# Patient Record
Sex: FEMALE | Race: WHITE | HISPANIC OR LATINO | Employment: UNEMPLOYED | URBAN - METROPOLITAN AREA
[De-identification: names, ages, dates, MRNs, and addresses within clinical notes are randomized per-mention and may not be internally consistent; named-entity substitution may affect disease eponyms.]

---

## 2022-11-07 ENCOUNTER — OFFICE VISIT (OUTPATIENT)
Dept: FAMILY MEDICINE CLINIC | Facility: CLINIC | Age: 57
End: 2022-11-07

## 2022-11-07 VITALS
SYSTOLIC BLOOD PRESSURE: 116 MMHG | BODY MASS INDEX: 31.83 KG/M2 | HEART RATE: 80 BPM | TEMPERATURE: 98.7 F | HEIGHT: 62 IN | WEIGHT: 173 LBS | DIASTOLIC BLOOD PRESSURE: 70 MMHG | OXYGEN SATURATION: 98 % | RESPIRATION RATE: 16 BRPM

## 2022-11-07 DIAGNOSIS — Z76.89 ENCOUNTER TO ESTABLISH CARE: Primary | ICD-10-CM

## 2022-11-07 DIAGNOSIS — E03.9 HYPOTHYROIDISM, UNSPECIFIED TYPE: ICD-10-CM

## 2022-11-07 DIAGNOSIS — Z11.59 NEED FOR HEPATITIS C SCREENING TEST: ICD-10-CM

## 2022-11-07 DIAGNOSIS — R53.83 OTHER FATIGUE: ICD-10-CM

## 2022-11-07 DIAGNOSIS — E78.5 HYPERLIPIDEMIA, UNSPECIFIED HYPERLIPIDEMIA TYPE: ICD-10-CM

## 2022-11-07 DIAGNOSIS — I10 PRIMARY HYPERTENSION: ICD-10-CM

## 2022-11-07 DIAGNOSIS — I35.8 AORTIC SYSTOLIC MURMUR ON EXAMINATION: ICD-10-CM

## 2022-11-07 DIAGNOSIS — Z11.4 SCREENING FOR HIV (HUMAN IMMUNODEFICIENCY VIRUS): ICD-10-CM

## 2022-11-07 DIAGNOSIS — J45.909 UNCOMPLICATED ASTHMA, UNSPECIFIED ASTHMA SEVERITY, UNSPECIFIED WHETHER PERSISTENT: ICD-10-CM

## 2022-11-07 RX ORDER — ATORVASTATIN CALCIUM 20 MG/1
20 TABLET, FILM COATED ORAL DAILY
Qty: 30 TABLET | Refills: 3 | Status: SHIPPED | OUTPATIENT
Start: 2022-11-07

## 2022-11-07 RX ORDER — ENALAPRIL MALEATE 10 MG/1
10 TABLET ORAL DAILY
Qty: 30 TABLET | Refills: 3 | Status: SHIPPED | OUTPATIENT
Start: 2022-11-07

## 2022-11-07 RX ORDER — LEVOTHYROXINE SODIUM 0.1 MG/1
100 TABLET ORAL DAILY
Qty: 30 TABLET | Refills: 3 | Status: SHIPPED | OUTPATIENT
Start: 2022-11-07

## 2022-11-07 RX ORDER — AMLODIPINE BESYLATE 10 MG/1
10 TABLET ORAL DAILY
Qty: 30 TABLET | Refills: 3 | Status: SHIPPED | OUTPATIENT
Start: 2022-11-07

## 2022-11-07 RX ORDER — ALBUTEROL SULFATE 90 UG/1
2 AEROSOL, METERED RESPIRATORY (INHALATION) EVERY 6 HOURS PRN
Qty: 18 G | Refills: 5 | Status: SHIPPED | OUTPATIENT
Start: 2022-11-07

## 2022-11-07 RX ORDER — FLUTICASONE PROPIONATE 50 MCG
1 SPRAY, SUSPENSION (ML) NASAL DAILY
Qty: 16 G | Refills: 3 | Status: SHIPPED | OUTPATIENT
Start: 2022-11-07

## 2022-11-07 NOTE — PROGRESS NOTES
Name: Darshan Raines      : 1965      MRN: [id number]  Encounter Provider: Jessica Reyes MD  Encounter Date: 2022   Encounter department: [address] [place] [address]     1  Encounter to establish care  Assessment & Plan:  Patient is transplant from [place]   - Advised to sign medical release form for transfer of records      2  Hypothyroidism, unspecified type  Assessment & Plan:  Managed on Synthroid 100 mcg  Currently reports excessive fatigue  No TSH on file  Orders:  -     TSH, 3rd generation with Free T4 reflex; Future  -     levothyroxine (Synthroid) 100 mcg tablet; Take 1 tablet (100 mcg total) by mouth daily    3  Uncomplicated asthma, unspecified asthma severity, unspecified whether persistent  Assessment & Plan:  Symptoms well controlled at this time  Managed on flonase as needed  Patient reports previously having an inhaler which she has not for quite some time  No PFTs on file  Patient is never smoker  Orders:  -     Complete PFT with post bronchodilator; Future  -     fluticasone (FLONASE) 50 mcg/act nasal spray; 1 spray into each nostril daily  -     albuterol (Ventolin HFA) 90 mcg/act inhaler; Inhale 2 puffs every 6 (six) hours as needed for wheezing    4  Primary hypertension  Assessment & Plan:  Well controlled on home meds: Amlodipine 10 mg QD and Enalapril 10 mg QD  BP today 116/70  Will continue home meds for now - will consider dropping agent if BP remains this well controlled    Orders:  -     amLODIPine (NORVASC) 10 mg tablet; Take 1 tablet (10 mg total) by mouth daily  -     enalapril (VASOTEC) 10 mg tablet; Take 1 tablet (10 mg total) by mouth daily    5  Hyperlipidemia, unspecified hyperlipidemia type  Assessment & Plan:  Managed on Lipitor 20 mg QD  No lipid panel on file  Orders:  -     Lipid Panel with Direct LDL reflex; Future  -     atorvastatin (LIPITOR) 20 mg tablet;  Take 1 tablet (20 mg total) by mouth daily    6  Need for hepatitis C screening test  -     Hepatitis C Antibody ([facility]); Future    7  Screening for HIV (human immunodeficiency virus)  -     HIV 1/2 Antigen/Antibody (4th Generation) w Reflex [facility]; Future    8  Aortic systolic murmur on examination  Assessment & Plan:  No history of heart disease or MI  Likely 2/2 age related calcifications    Orders:  -     Echo complete w/ contrast if indicated; Future; Expected date: 11/07/2022    9  Other fatigue  Assessment & Plan:  History of hypothyroidism   Denies history of anemia  Will check labs as per orders below    Orders:  -     CBC and differential; Future  -     Comprehensive metabolic panel; Future  -     Vitamin D 25 hydroxy; Future         Subjective     Patient presents today to establish care  Moved to [place] 2 months prior from the [place]  Previously lived in the  and emigrated to [address] [place] [address] in 2019  PMH: asthma, htn, hyperlipidemia, hypothyroidism, history of uterine cancer  Medications: Atorvastatin 20 mg QD, Synthroid 100 mcg, Amlodipine 10 mg, Enalipril 10 mg, Flonase 50 mcg  Allergies: Reports emotional stress can exacerbate asthma  Surg Hx: 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,  Hysterectomy, appendectomy, surgical intervention for sinusitis   Social Hx: Denies tobacco, alcohol, or illicit drug use  Fam Hx:     Current Concerns: None       Review of Systems   Constitutional: Negative for chills and fever  HENT: Negative for congestion, ear pain, rhinorrhea and sinus pain  Eyes: Negative for visual disturbance  Respiratory: Negative for chest tightness, shortness of breath and wheezing  Cardiovascular: Negative for chest pain and palpitations  Gastrointestinal: Negative for abdominal pain, constipation, diarrhea and vomiting  Endocrine: Negative for polyuria  Genitourinary: Negative for dysuria  Musculoskeletal: Negative for arthralgias and myalgias     Neurological: Negative for dizziness, syncope and light-headedness  Psychiatric/Behavioral: Negative for hallucinations, self-injury and suicidal ideas  Past Medical History:   Diagnosis Date   • Hypertension      Past Surgical History:   Procedure Laterality Date   • APPENDECTOMY     • HYSTERECTOMY     • NOSE SURGERY     • THYROID SURGERY       No family history on file  Social History     Socioeconomic History   • Marital status:      Spouse name: None   • Number of children: None   • Years of education: None   • Highest education level: None   Occupational History   • None   Tobacco Use   • Smoking status: Never Smoker   • Smokeless tobacco: Never Used   Substance and Sexual Activity   • Alcohol use: Not Currently   • Drug use: Never   • Sexual activity: None   Other Topics Concern   • None   Social History Narrative   • None     Social Determinants of Health     Financial Resource Strain: Low Risk    • Difficulty of Paying Living Expenses: Not hard at all   Food Insecurity: No Food Insecurity   • Worried About Running Out of Food in the Last Year: Never true   • Ran Out of Food in the Last Year: Never true   Transportation Needs: No Transportation Needs   • Lack of Transportation (Medical): No   • Lack of Transportation (Non-Medical): No   Physical Activity: Not on file   Stress: Not on file   Social Connections: Not on file   Intimate Partner Violence: Not on file   Housing Stability: Not on file     No current outpatient medications on file prior to visit  No Known Allergies    There is no immunization history on file for this patient  Objective     /70 (BP Location: Right arm, Patient Position: Sitting, Cuff Size: Large)   Pulse 80   Temp 98 7 °F (37 1 °C) (Temporal)   Resp 16   Ht 5' 2" (1 575 m)   Wt 78 5 kg (173 lb)   SpO2 98%   Breastfeeding No   BMI 31 64 kg/m²     Physical Exam  Constitutional:       Appearance: Normal appearance  HENT:      Head: Normocephalic and atraumatic        Nose: Nose normal    Eyes: Conjunctiva/sclera: Conjunctivae normal    Cardiovascular:      Rate and Rhythm: Normal rate and regular rhythm  Heart sounds: Murmur (grade I/II midsystolic click at aortic region) heard  Pulmonary:      Effort: Pulmonary effort is normal       Breath sounds: Normal breath sounds  Musculoskeletal:         General: Normal range of motion  Cervical back: Normal range of motion  Skin:     General: Skin is warm and dry  Neurological:      Mental Status: She is alert and oriented to person, place, and time     Psychiatric:         Behavior: Behavior normal        Kolby Toney MD

## 2022-11-07 NOTE — PATIENT INSTRUCTIONS
Plan de alimentación con "enfoque dietético para detener la hipertensión” (DASH, por deepti siglas en inglés)   LO QUE NECESITA SABER:   El plan de alimentación DASH está diseñado para ayudar a prevenir o disminuir la hipertensión  También puede ayudar a bajar el colesterol rocio (colesterol LDL) y disminuir keyes riesgo de enfermedad cardíaca  El plan es bajo en sodio, azúcar, grasas dañinas, y grasas en keyes totalidad  Es alto en potasio, calcio, magnesio y [place]  Estos nutrientes se agregan al consumir más frutas, vegetales y granos enteros  Con el plan de alimentación DASH, usted necesita consumir un número específico de porciones de cada jose roberto de alimentos  [place] le ayudará a consumir las cantidades suficientes de ciertos nutrientes y limitar otros  La cantidad de porciones que usted debe comer depende de la cantidad de calorías que usted necesita  Keyes dietista puede ayudarlo a crear planes de comidas con la cantidad [place] de porciones para cada jose roberto de alimentos  INSTRUCCIONES SOBRE EL JOI HOSPITALARIA:   Lo que necesita saber acerca del sodio: Keyes dietista le indicará la cantidad de sodio que usted debe consumir a diario  La gente que tiene la presión arterial joi debe consumir de 1,500 a 2,300 mg de sodio al día dar bonifacio  Kavon cucharadita (cdta) de sal tiene 2,300 mg de sodio  [place] puede parecer dar kavon meta difícil, sanjay pequeños cambios en los alimentos que usted consume pueden hacer kavon gran diferencia  Keyes médico o dietista puede ayudarlo a crear un plan alimenticio que cumpla keyes límite de sodio  Suzy las etiquetas de los alimentos  Las etiquetas pueden ayudarle a escoger alimentos bajos en sodio  La cantidad de sodio está incluida en miligramos (mg)  La columna del porcentaje de valor diario indica la cantidad de necesidades diarias satisfechas con 1 porción del alimento para cada nutriente en la lista  Escoja alimentos que tengan menos de 5% del porcentaje diario de [place]   Estos alimentos se consideran bajos en sodio  Los alimentos que tienen 20% o más del porcentaje diario de sodio se consideran alimentos altos en sodio  Evite alimentos que tengan más de 300 mg de sodio por porción  Escoja alimentos Sabine Sprinkles diga que son bajos en sodio, con sodio reducido, o sin sal agregada  Limite la sal agregada  No sale la comida en la navas si se añade sal al cocinar  Use hierbas y condimentos, dar cebollas, ajo y especias sin sal para agregar sabor  Use jugo de lima, oneil o vinagre para agregar un sabor ácido  Use chiles picantes o kavon cantidad pequeña de salsa picante para agregar un sabor picante  Limite los alimentos con alto contenido de sal agregada, dar los siguientes:    Condimentos hechos con sal, dar sal de ajo, sal de apio, sal de cebolla, sal condimentada, suavizantes para sofy, y glutamato de monosodio (MSG, por deepti siglas en inglés)    Sopa Miso y mezclas para sopa enlatadas o secas    Salsa de soya regular, salsa de [address], [address] [facility], salsa para [facility], [address] [facility], y la mayoría de las vinagres con sabor    Alimentos para merendar, dar erlinda tostadas, palomitas de [place], pretzels, piel de cerdo, galletas de soda [facility], y nueces [employer], dar cenas, entradas, vegetales en salsa, y sofy [employer] sustitutos para la sal  Pregúntele a keyes médico si es posible usar sustitutos de la sal  Algunos sustitutos de la sal vienen con ingredientes que pueden ser dañinos si usted tiene ciertos padecimientos médicos  Escoja los alimentos cuidadosamente cuando sale a comer a restaurantes: las comidas de los restaurantes, sobre todo restaurantes de comida rápida, cortez siempre son altas en sodio  Algunos restaurantes ofrecen información nutricional que indica la cantidad de sodio en deepti alimentos   Pida que preparen deepti comidas con menos sal o sin sal     Lo que necesita saber acerca de las grasas: Las grasas insaturadas y los ácidos grasos omega-3 son ejemplos de grasas saludables  Las grasas no saludables incluyen las grasas saturadas y las grasas trans  Incluya grasas saludables, dar las siguientes:     Aceites de cocina, dar el de soja, canola, wheat o girasol    Pescados grasos, dar el salmón, el atún, la caballa o las klarissa    Aceite de linaza o linaza molida    ½ taza de frijoles cocidos, dar frijoles negros, frijoles rojos o frijoles pintos    1½ onzas de joann secos bajos en sodio, dar almendras o nueces    [place] de maní baja en azúcar y sodio    [place], dar las de chía o girasol       Limite o no consuma grasas poco saludables, dar los siguientes:     Alimentos que contienen grasa de origen animal, dar las sofy grasas, la [place], la [place] y la crema    Agia [place]kla, margarina en shila, aceite de box y aceite de hamlet     Aderezo de ensalada completo o cremoso    Sopa cremosa    Galletas, erlinda fritas y productos de panadería elaborados con margarina o manteca    Alimentos fritos con grasas poco saludables    Salsa de carne y salsas, dar la [place] o la de queso    Lo que necesita saber acerca de los carbohidratos: Todos los carbohidratos se descomponen en azúcar  Los carbohidratos complejos contienen más fibra que los simples  [place] significa que los carbohidratos complejos entran en el torrente sanguíneo más lentamente y causan menos picos de azúcar en la ludmila  Intenta incluir más carbohidratos complejos y menos carbohidratos simples    Incluya carbohidratos complejos, dar los siguientes:     1 tajada de pan integral    1 onza de cereal seco que no contenga azúcar añadida    ½ taza de kimberli cocida    2 onzas de pasta integral cocida    ½ taza de arroz integral cocido    Limite o no consuma carbohidratos simples, dar los siguientes:     Productos horneados, dar rosquillas, pasteles y galletas    Mezclas para pan de maíz y galletas    Arroz pimentel y mezclas de pasta, dar los [place] con queso de caja    Cereales instantáneos y fríos que contienen azúcar    Alois Cashing y helado que contienen azúcar    Condimentos, dar el ketchup    Bebidas con alto contenido en azúcar, dar refrescos, limonadas y jugos de frutas    Lo que usted necesita saber sobre las verduras y las frutas: Las verduras y las frutas pueden ser frescas, congeladas o enlatadas  Si es posible, trate de elegir opciones enlatadas bajas en sodio  Incluya kavon variedad de verduras y frutas, dar las siguientes:     1 [place], [place] o melocotón medianos (aproximadamente ½ taza picada)    ½ banana pequeña    ½ taza de bayas, dar arándanos, fresas o moras    1 taza de verduras de hoja jacinto crudas, dar [place], espinacas, col rizada o berza    ½ taza de verduras congeladas o enlatadas (sin sal agregada), dar judías verdes    ½ taza de fruta fresca, congelada o enlatada (enlatada en sirope liviano o jugo de fruta)    ½ taza de jugo de verduras o frutas    Limite o no consuma verduras y frutas elaboradas de las siguientes maneras:     [place] congelada, dar las cerezas, con azúcar añadida    Frutas en crema o salsa de [place]    Las verduras enlatadas son altas en sodio  Sauerkraut, vegetales en escabeche, y otros alimentos preparados con vinagre    Vegetales fritos o vegetales en mantequilla o salsas altas en grasas    Lo que necesita saber acerca de los alimentos con proteína:   Incluya alimentos proteicos magros o bajos en grasa, dar los siguientes:     Deloris samantha (charis, [place]) sin piel    Pescado (sobre todo pescado con grasa, dar salmón, atún fresco o caballa)    [place] de res o de cerdo magra (leni, carne molida extra [place])    Claras de [place] y sustitutos del huevo    1 taza de leche descremada o leche 1%    1½ onzas de queso descremado o bajo en grasas    6 onzas de yogurt descremado o bajo en grasas    Limite o no consuma alimentos con alto contenido de proteínas, [facility] siguientes     [address] o [place], dar carne preparada con maíz, tocineta, jamón, perros calientes, y salchichas    Frijoles enlatados y deloris enlatadas o en pasta, dar deloris en conserva, klarissa, anchoas y [address] de [address] para emparedado, dar [place], [place], [place], y carne en rebanada    Deloris altas en grasas (biste estilo [place]-bone, carne molida para hamburguesas, costillas)    Huevos enteros y yemas de [place]    Leche [place], [place] al 2% y crema    Queso normal y queso fundido    Otras pautas que debe seguir:  Mantenga un peso saludable  Keyes riesgo de enfermedad cardíaca es aún más alto si usted tiene sobrepeso  Keyes médico podría sugerirle que adelgace si tiene sobrepeso  Usted puede perder peso si se propone consumir menos calorías y alimentos que tengan azúcar y grasas agregadas  El plan de alimentación DASH puede ayudarle a lograrlo  Ijeoma buena forma de disminuir el consumo de calorías es consumiendo porciones más pequeñas en cada comida y menos meriendas entre comidas  Consulte a keyes médico para obtener más información sobre cómo adelgazar  Realice actividad física con regularidad  El ejercicio regular puede ayudarle a alcanzar o mantener un peso saludable  El ejercicio regular también puede ayudarle a disminuir keyes presión arterial y mejorar deepti niveles de colesterol  Josseline ejercicios moderados por 30 minutos o más todos los días de la [place]  Para bajar peso, asegúrese de ejercitarse por lo menos 60 minutos  Consulte con keyes médico sobre un programa de ejercicio adecuado para usted  Limite el consumo de alcohol  Las mujeres deberían limitar el consumo de alcohol a 1 bebida por día  Los hombres deberían limitar el consumo de alcohol a 2 tragos al día  Un trago equivale a 12 onzas de cerveza, 5 onzas de vino o 1 onza y ½ de licor  Para más información:  [facility] [id number]  [place] [address]  Phone: 0- [phone]  Web Address: ItCheaper no    © Copyright [employer] 2022 Information is for End User's use only and may not be sold, redistributed or otherwise used for commercial purposes  All illustrations and images included in CareNotes® are the copyrighted property of [employer]  or [address] [place] [address] es sólo para uso en educación  Keyes intención no es darle un consejo médico sobre enfermedades o tratamientos  Colsulte con keyes Kermitt Console farmacéutico antes de seguir cualquier régimen médico para saber si es seguro y efectivo para usted

## 2022-11-08 NOTE — ASSESSMENT & PLAN NOTE
Patient is transplant from [place]   - Advised to sign medical release form for transfer of records

## 2022-11-08 NOTE — ASSESSMENT & PLAN NOTE
Symptoms well controlled at this time  Managed on flonase as needed  Patient reports previously having an inhaler which she has not for quite some time  No PFTs on file  Patient is never smoker

## 2022-11-08 NOTE — ASSESSMENT & PLAN NOTE
Well controlled on home meds: Amlodipine 10 mg QD and Enalapril 10 mg QD  BP today 116/70    Will continue home meds for now - will consider dropping agent if BP remains this well controlled

## 2022-11-09 ENCOUNTER — APPOINTMENT (OUTPATIENT)
Dept: LAB | Facility: CLINIC | Age: 57
End: 2022-11-09

## 2022-11-09 DIAGNOSIS — Z11.4 SCREENING FOR HIV (HUMAN IMMUNODEFICIENCY VIRUS): ICD-10-CM

## 2022-11-09 DIAGNOSIS — E03.9 HYPOTHYROIDISM, UNSPECIFIED TYPE: ICD-10-CM

## 2022-11-09 DIAGNOSIS — E78.5 HYPERLIPIDEMIA, UNSPECIFIED HYPERLIPIDEMIA TYPE: ICD-10-CM

## 2022-11-09 DIAGNOSIS — Z11.59 NEED FOR HEPATITIS C SCREENING TEST: ICD-10-CM

## 2022-11-09 DIAGNOSIS — R53.83 OTHER FATIGUE: ICD-10-CM

## 2022-11-09 LAB
25(OH)D3 SERPL-MCNC: 28.6 NG/ML (ref 30–100)
ALBUMIN SERPL BCP-MCNC: 4.2 G/DL (ref 3.5–5)
ALP SERPL-CCNC: 68 U/L (ref 46–116)
ALT SERPL W P-5'-P-CCNC: 23 U/L (ref 12–78)
ANION GAP SERPL CALCULATED.3IONS-SCNC: 1 MMOL/L (ref 4–13)
AST SERPL W P-5'-P-CCNC: 26 U/L (ref 5–45)
BASOPHILS # BLD AUTO: 0.02 THOUSANDS/ÂΜL (ref 0–0.1)
BASOPHILS NFR BLD AUTO: 0 % (ref 0–1)
BILIRUB SERPL-MCNC: 0.63 MG/DL (ref 0.2–1)
BUN SERPL-MCNC: 13 MG/DL (ref 5–25)
CALCIUM SERPL-MCNC: 9.4 MG/DL (ref 8.3–10.1)
CHLORIDE SERPL-SCNC: 108 MMOL/L (ref 96–108)
CHOLEST SERPL-MCNC: 129 MG/DL
CO2 SERPL-SCNC: 29 MMOL/L (ref 21–32)
CREAT SERPL-MCNC: 0.84 MG/DL (ref 0.6–1.3)
EOSINOPHIL # BLD AUTO: 0.09 THOUSAND/ÂΜL (ref 0–0.61)
EOSINOPHIL NFR BLD AUTO: 2 % (ref 0–6)
ERYTHROCYTE [DISTWIDTH] IN BLOOD BY AUTOMATED COUNT: 12.7 % (ref 11.6–15.1)
GFR SERPL CREATININE-BSD FRML MDRD: 77 ML/MIN/1.73SQ M
GLUCOSE P FAST SERPL-MCNC: 94 MG/DL (ref 65–99)
HCT VFR BLD AUTO: 45.7 % (ref 34.8–46.1)
HCV AB SER QL: NORMAL
HDLC SERPL-MCNC: 41 MG/DL
HGB BLD-MCNC: 14.2 G/DL (ref 11.5–15.4)
IMM GRANULOCYTES # BLD AUTO: 0.01 THOUSAND/UL (ref 0–0.2)
IMM GRANULOCYTES NFR BLD AUTO: 0 % (ref 0–2)
LDLC SERPL CALC-MCNC: 61 MG/DL (ref 0–100)
LYMPHOCYTES # BLD AUTO: 2.41 THOUSANDS/ÂΜL (ref 0.6–4.47)
LYMPHOCYTES NFR BLD AUTO: 43 % (ref 14–44)
MCH RBC QN AUTO: 30.1 PG (ref 26.8–34.3)
MCHC RBC AUTO-ENTMCNC: 31.1 G/DL (ref 31.4–37.4)
MCV RBC AUTO: 97 FL (ref 82–98)
MONOCYTES # BLD AUTO: 0.35 THOUSAND/ÂΜL (ref 0.17–1.22)
MONOCYTES NFR BLD AUTO: 6 % (ref 4–12)
NEUTROPHILS # BLD AUTO: 2.74 THOUSANDS/ÂΜL (ref 1.85–7.62)
NEUTS SEG NFR BLD AUTO: 49 % (ref 43–75)
NRBC BLD AUTO-RTO: 0 /100 WBCS
PLATELET # BLD AUTO: 293 THOUSANDS/UL (ref 149–390)
PMV BLD AUTO: 10.3 FL (ref 8.9–12.7)
POTASSIUM SERPL-SCNC: 4.4 MMOL/L (ref 3.5–5.3)
PROT SERPL-MCNC: 8.3 G/DL (ref 6.4–8.4)
RBC # BLD AUTO: 4.72 MILLION/UL (ref 3.81–5.12)
SODIUM SERPL-SCNC: 138 MMOL/L (ref 135–147)
TRIGL SERPL-MCNC: 136 MG/DL
TSH SERPL DL<=0.05 MIU/L-ACNC: 1.04 UIU/ML (ref 0.45–4.5)
WBC # BLD AUTO: 5.62 THOUSAND/UL (ref 4.31–10.16)

## 2022-11-10 LAB — HIV 1+2 AB+HIV1 P24 AG SERPL QL IA: NORMAL

## 2022-11-11 ENCOUNTER — OFFICE VISIT (OUTPATIENT)
Dept: FAMILY MEDICINE CLINIC | Facility: CLINIC | Age: 57
End: 2022-11-11

## 2022-11-11 VITALS
OXYGEN SATURATION: 98 % | TEMPERATURE: 97.9 F | WEIGHT: 175 LBS | SYSTOLIC BLOOD PRESSURE: 122 MMHG | RESPIRATION RATE: 18 BRPM | HEART RATE: 80 BPM | BODY MASS INDEX: 32.2 KG/M2 | HEIGHT: 62 IN | DIASTOLIC BLOOD PRESSURE: 74 MMHG

## 2022-11-11 DIAGNOSIS — Z02.4 DRIVER'S PERMIT PE (PHYSICAL EXAMINATION): Primary | ICD-10-CM

## 2022-11-11 NOTE — PROGRESS NOTES
Name: Lamberto Bolivar      : 1965      MRN: [id number]  Encounter Provider: Viridiana Alexandra MD  Encounter Date: 2022   Encounter department: [address] [place] [address]  's permit PE (physical examination)  Assessment & Plan:  Patient's neurological history was assessed and reviewed  Patient has no neuro-epileptic, neurocognitive or neurodegenerative diseases that would prevent them from operating a motor vehicle  Patient is not currently on any medication that would cause neurologic side effects  Patient has no significant past medical history nor any significant family history that would interfere with their ability to drive a vehicle  - Physical exam was unremarkable for neurologic deficits  - Patient is medically cleared to obtain his 's Licence upon visual eye exam clearance  No further workup is required  - Discussed importance of seat belt safety for  and all passengers in the car  Discussed importance of patient’s knowledge of car seat and booster seat use when applicable  - Advised not to use alcohol, drugs, or substances which inhibit ability to operate a vehicle  - Discussed refraining from use of cell phone or other devices which can distract while operating a vehicle  Reviewed importance of familiarizing one's self with the rules and regulations outlined in drivers manual   - Advised patient to update our office immediately if there are any changes to health/medical records           Subjective     This is a pleasant 64 yo patient with no significant PMH who presents to the [facility]  Patient states that she is in good standing health and is currently not on any medications or substances that impair cognition  Patient denies having debilitating disease, neurologic deficits, history of seizure disorder, or psychiatric conditions        Review of Systems Constitutional: Negative for chills and fever  HENT: Negative for congestion, ear pain, rhinorrhea and sinus pain  Eyes: Negative for visual disturbance  Respiratory: Negative for chest tightness, shortness of breath and wheezing  Cardiovascular: Negative for chest pain and palpitations  Gastrointestinal: Negative for abdominal pain, constipation, diarrhea and vomiting  Endocrine: Negative for polyuria  Genitourinary: Negative for dysuria  Musculoskeletal: Negative for arthralgias and myalgias  Neurological: Negative for dizziness, syncope and light-headedness  Psychiatric/Behavioral: Negative for hallucinations, self-injury and suicidal ideas  Past Medical History:   Diagnosis Date   • Hypertension      Past Surgical History:   Procedure Laterality Date   • APPENDECTOMY     • HYSTERECTOMY     • NOSE SURGERY     • THYROID SURGERY       No family history on file  Social History     Socioeconomic History   • Marital status:      Spouse name: None   • Number of children: None   • Years of education: None   • Highest education level: None   Occupational History   • None   Tobacco Use   • Smoking status: Never Smoker   • Smokeless tobacco: Never Used   Substance and Sexual Activity   • Alcohol use: Not Currently   • Drug use: Never   • Sexual activity: None   Other Topics Concern   • None   Social History Narrative   • None     Social Determinants of Health     Financial Resource Strain: Low Risk    • Difficulty of Paying Living Expenses: Not hard at all   Food Insecurity: No Food Insecurity   • Worried About Running Out of Food in the Last Year: Never true   • Ran Out of Food in the Last Year: Never true   Transportation Needs: No Transportation Needs   • Lack of Transportation (Medical): No   • Lack of Transportation (Non-Medical):  No   Physical Activity: Not on file   Stress: Not on file   Social Connections: Not on file   Intimate Partner Violence: Not on file   Housing Stability: Not on file     Current Outpatient Medications on File Prior to Visit   Medication Sig   • albuterol (Ventolin HFA) 90 mcg/act inhaler Inhale 2 puffs every 6 (six) hours as needed for wheezing   • amLODIPine (NORVASC) 10 mg tablet Take 1 tablet (10 mg total) by mouth daily   • atorvastatin (LIPITOR) 20 mg tablet Take 1 tablet (20 mg total) by mouth daily   • enalapril (VASOTEC) 10 mg tablet Take 1 tablet (10 mg total) by mouth daily   • fluticasone (FLONASE) 50 mcg/act nasal spray 1 spray into each nostril daily   • levothyroxine (Synthroid) 100 mcg tablet Take 1 tablet (100 mcg total) by mouth daily     No Known Allergies    There is no immunization history on file for this patient  Objective     /74 (BP Location: Left arm, Patient Position: Sitting, Cuff Size: Standard)   Pulse 80   Temp 97 9 °F (36 6 °C) (Temporal)   Resp 18   Ht 5' 2" (1 575 m)   Wt 79 4 kg (175 lb)   SpO2 98%   BMI 32 01 kg/m²     Physical Exam  Constitutional:       Appearance: Normal appearance  HENT:      Head: Normocephalic and atraumatic  Right Ear: External ear normal       Left Ear: External ear normal       Nose: Nose normal    Eyes:      Extraocular Movements: Extraocular movements intact  Conjunctiva/sclera: Conjunctivae normal    Cardiovascular:      Rate and Rhythm: Normal rate  Heart sounds: Normal heart sounds  Pulmonary:      Effort: Pulmonary effort is normal       Breath sounds: Normal breath sounds  Musculoskeletal:         General: Normal range of motion  Cervical back: Normal range of motion  Skin:     General: Skin is warm and dry  Neurological:      Mental Status: She is alert and oriented to person, place, and time     Psychiatric:         Behavior: Behavior normal        Jessica Reyes MD

## 2022-11-11 NOTE — ASSESSMENT & PLAN NOTE
Patient's neurological history was assessed and reviewed  Patient has no neuro-epileptic, neurocognitive or neurodegenerative diseases that would prevent them from operating a motor vehicle  Patient is not currently on any medication that would cause neurologic side effects  Patient has no significant past medical history nor any significant family history that would interfere with their ability to drive a vehicle  - Physical exam was unremarkable for neurologic deficits  - Patient is medically cleared to obtain his 's Licence upon visual eye exam clearance  No further workup is required  - Discussed importance of seat belt safety for  and all passengers in the car  Discussed importance of patient’s knowledge of car seat and booster seat use when applicable  - Advised not to use alcohol, drugs, or substances which inhibit ability to operate a vehicle  - Discussed refraining from use of cell phone or other devices which can distract while operating a vehicle   Reviewed importance of familiarizing one's self with the rules and regulations outlined in drivers manual   - Advised patient to update our office immediately if there are any changes to health/medical records

## 2022-12-08 ENCOUNTER — TELEPHONE (OUTPATIENT)
Dept: FAMILY MEDICINE CLINIC | Facility: CLINIC | Age: 57
End: 2022-12-08

## 2022-12-08 ENCOUNTER — OFFICE VISIT (OUTPATIENT)
Dept: DENTISTRY | Facility: CLINIC | Age: 57
End: 2022-12-08

## 2022-12-08 VITALS — SYSTOLIC BLOOD PRESSURE: 115 MMHG | HEART RATE: 73 BPM | DIASTOLIC BLOOD PRESSURE: 76 MMHG | TEMPERATURE: 98.7 F

## 2022-12-08 DIAGNOSIS — Z01.20 ENCOUNTER FOR DENTAL EXAMINATION: Primary | ICD-10-CM

## 2022-12-08 NOTE — PROGRESS NOTES
Ally Palmer came for check up and with c/o "I want new Upper dentures as they are old and loose"  And I have pain in reln to tooth lr side  Med hx rvd: ASA II  Pain level: 4      PAN, 4 PA's taken    O/E:  E/o: wnl  I/O  Pt wears Upper CD , about 21 yrs old, loose , pt  Adv to use fixodent till she has a new denture made  Denture is intact, not fractured  Montes shows no pap  Pt has existing #22,23,24,25,26,27, 29  Deep decay #29D, tooth mesially rotated,  Decay extending to root part of tooth, seen clin and on pa, mild  tenderness on percussion, no pap seen,  Dx Irrev pulpitis,  adv extrn as tooth non restorable  and otc prn pain    Lower teeth Period stable, pr depth 2-3 mm, mild plaque, adv prophy    And : fills #22(MFL), 23(DFL), 26(DFL)   pt  Adv cast partial dent replacing missing teeth lower arch    Pt does not have dental ins, is self pay, and wants to wait for Dentures, till she has ins  In Jan 2023  Nv: Extrn #29 ONLY followed by  prophy and fills   Pt  Left the off comf,and in agreement

## 2022-12-21 ENCOUNTER — TELEPHONE (OUTPATIENT)
Dept: FAMILY MEDICINE CLINIC | Facility: CLINIC | Age: 57
End: 2022-12-21

## 2022-12-23 ENCOUNTER — TELEPHONE (OUTPATIENT)
Dept: FAMILY MEDICINE CLINIC | Facility: CLINIC | Age: 57
End: 2022-12-23

## 2023-01-05 ENCOUNTER — OFFICE VISIT (OUTPATIENT)
Dept: FAMILY MEDICINE CLINIC | Facility: CLINIC | Age: 58
End: 2023-01-05

## 2023-01-05 VITALS
SYSTOLIC BLOOD PRESSURE: 110 MMHG | RESPIRATION RATE: 16 BRPM | HEART RATE: 95 BPM | TEMPERATURE: 97 F | OXYGEN SATURATION: 99 % | HEIGHT: 62 IN | WEIGHT: 175 LBS | BODY MASS INDEX: 32.2 KG/M2 | DIASTOLIC BLOOD PRESSURE: 70 MMHG

## 2023-01-05 DIAGNOSIS — J45.909 UNCOMPLICATED ASTHMA, UNSPECIFIED ASTHMA SEVERITY, UNSPECIFIED WHETHER PERSISTENT: Primary | ICD-10-CM

## 2023-01-05 RX ORDER — BUDESONIDE AND FORMOTEROL FUMARATE DIHYDRATE 80; 4.5 UG/1; UG/1
2 AEROSOL RESPIRATORY (INHALATION) 2 TIMES DAILY PRN
Qty: 10.2 G | Refills: 3 | Status: SHIPPED | OUTPATIENT
Start: 2023-01-05

## 2023-01-05 RX ORDER — DICLOFENAC SODIUM 75 MG/1
75 TABLET, DELAYED RELEASE ORAL 2 TIMES DAILY
Qty: 28 TABLET | Refills: 0 | Status: SHIPPED | OUTPATIENT
Start: 2023-01-05

## 2023-01-05 RX ORDER — FLUTICASONE PROPIONATE 50 MCG
1 SPRAY, SUSPENSION (ML) NASAL DAILY
Qty: 16 G | Refills: 3 | Status: SHIPPED | OUTPATIENT
Start: 2023-01-05

## 2023-01-05 RX ORDER — ALBUTEROL SULFATE 90 UG/1
2 AEROSOL, METERED RESPIRATORY (INHALATION) EVERY 6 HOURS PRN
Qty: 18 G | Refills: 5 | Status: SHIPPED | OUTPATIENT
Start: 2023-01-05

## 2023-01-05 NOTE — PROGRESS NOTES
Name: Pavel Bello      : 1965      MRN: [id number]  Encounter Provider: Terrell Roman MD  Encounter Date: 2023   Encounter department: [address] [place] [address]     1  Uncomplicated asthma, unspecified asthma severity, unspecified whether persistent  Assessment & Plan:  Symptoms uncontrolled at this time  Patient reports symptoms of chest wall and upper back tenderness, likely 2/2 costochondritis in the setting of recent recent URI/cough  Currently using albuterol inhaler twice daily  Reports to also requiring inhaler approximately twice daily even prior to recent URI  Reports to running out of Flonase  Not currently in exacerbation  No PFTs on file  Patient is never smoker  Will initiate trial of low dose Symbicort PRN with plans to titrate as needed  Will prescribe Diclofenac for chest wall and back tiscomort  Will refill Flonase  Orders:  -     budesonide-formoterol (Symbicort) 80-4 5 MCG/ACT inhaler; Inhale 2 puffs 2 (two) times a day as needed (asthma) Rinse mouth after use  -     diclofenac (VOLTAREN) 75 mg EC tablet; Take 1 tablet (75 mg total) by mouth 2 (two) times a day  -     fluticasone (FLONASE) 50 mcg/act nasal spray; 1 spray into each nostril daily  -     albuterol (Ventolin HFA) 90 mcg/act inhaler; Inhale 2 puffs every 6 (six) hours as needed for wheezing         Subjective     Patient reports pain and cramping in chest wall with radiation to shoulders and upper back for several months with worsening symptoms following recent URI  Patient reports discomfort leaves her feeling very tired  Reports chest wall tightness keeps her up at night  Reports to having a cold 10 days ago with increased shortness of breath and cough which required that she use her albuterol inhaler more frequently (up to 3 times per day)  Currently patient reports improvement of symptoms and to using her inhaler twice per day    Reports only mild shortness of breath and resolution of cough at this time  Denies any wheezing  Review of Systems   Constitutional: Negative for chills and fever  HENT: Negative for congestion, ear pain, rhinorrhea and sinus pain  Eyes: Negative for visual disturbance  Respiratory: Negative for chest tightness and wheezing  Cardiovascular: Negative for chest pain and palpitations  Gastrointestinal: Negative for abdominal pain, constipation, diarrhea and vomiting  Endocrine: Negative for polyuria  Genitourinary: Negative for dysuria  Musculoskeletal: Negative for arthralgias and myalgias  Neurological: Negative for dizziness, syncope and light-headedness  Psychiatric/Behavioral: Negative for hallucinations, self-injury and suicidal ideas  Past Medical History:   Diagnosis Date   • Hypertension      Past Surgical History:   Procedure Laterality Date   • APPENDECTOMY     • HYSTERECTOMY     • NOSE SURGERY     • THYROID SURGERY       No family history on file  Social History     Socioeconomic History   • Marital status:      Spouse name: None   • Number of children: None   • Years of education: None   • Highest education level: None   Occupational History   • None   Tobacco Use   • Smoking status: Never   • Smokeless tobacco: Never   Substance and Sexual Activity   • Alcohol use: Not Currently   • Drug use: Never   • Sexual activity: None   Other Topics Concern   • None   Social History Narrative   • None     Social Determinants of Health     Financial Resource Strain: Low Risk    • Difficulty of Paying Living Expenses: Not hard at all   Food Insecurity: No Food Insecurity   • Worried About [address] in the Last Year: Never true   • Ran Out of Food in the Last Year: Never true   Transportation Needs: No Transportation Needs   • Lack of Transportation (Medical): No   • Lack of Transportation (Non-Medical):  No   Physical Activity: Not on file   Stress: Not on file   Social Connections: Not on file   Intimate Partner Violence: Not on file   Housing Stability: Not on file     Current Outpatient Medications on File Prior to Visit   Medication Sig   • amLODIPine (NORVASC) 10 mg tablet Take 1 tablet (10 mg total) by mouth daily   • atorvastatin (LIPITOR) 20 mg tablet Take 1 tablet (20 mg total) by mouth daily   • enalapril (VASOTEC) 10 mg tablet Take 1 tablet (10 mg total) by mouth daily   • levothyroxine (Synthroid) 100 mcg tablet Take 1 tablet (100 mcg total) by mouth daily     No Known Allergies    There is no immunization history on file for this patient  Objective     /70 (BP Location: Left arm, Patient Position: Sitting, Cuff Size: Standard)   Pulse 95   Temp (!) 97 °F (36 1 °C) (Temporal)   Resp 16   Ht 5' 2" (1 575 m)   Wt 79 4 kg (175 lb)   SpO2 99%   BMI 32 01 kg/m²     Physical Exam  Constitutional:       Appearance: Normal appearance  HENT:      Head: Normocephalic and atraumatic  Nose: Nose normal    Eyes:      Conjunctiva/sclera: Conjunctivae normal    Cardiovascular:      Rate and Rhythm: Normal rate  Heart sounds: Normal heart sounds  Pulmonary:      Effort: Pulmonary effort is normal  No respiratory distress  Breath sounds: Wheezing (expiratory wheezes in bilateral upper lobes) present  No rales  Musculoskeletal:         General: Normal range of motion  Cervical back: Normal range of motion  Comments: Tenderness on palpation of chest wall and upper back   Skin:     General: Skin is warm and dry  Neurological:      Mental Status: She is alert and oriented to person, place, and time     Psychiatric:         Behavior: Behavior normal        Castillo Wright MD

## 2023-01-05 NOTE — ASSESSMENT & PLAN NOTE
Symptoms uncontrolled at this time  Patient reports symptoms of chest wall and upper back tenderness, likely 2/2 costochondritis in the setting of recent recent URI/cough  Currently using albuterol inhaler twice daily  Reports to also requiring inhaler approximately twice daily even prior to recent URI  Reports to running out of Flonase  Not currently in exacerbation  No PFTs on file  Patient is never smoker  Will initiate trial of low dose Symbicort PRN with plans to titrate as needed  Will prescribe Diclofenac for chest wall and back tiscomort  Will refill Flonase

## 2023-01-05 NOTE — PATIENT INSTRUCTIONS
Ejercicios para la espalda superior   CUIDADO AMBULATORIO:   Los ejercicios para la espalda superior ayudan a sanar y [employer] de keyes espalda y prevenir otra lesión  Pregúntele a keyes médico si usted necesita acudir con un fisioterapeuta para que le indique ejercicios más avanzado  Josseline deepti ejercicios sobre kavon colchoneta o superficie firme (no en la cama) para rui soporte a la [place]  Muévase lenta y suavemente  Evite movimientos rápidos o bruscos  Respire normalmente  No contenga la respiración  Deténgase si siente dolor  Es normal que sienta cierta molestia al principio  Practicar los ejercicios con regularidad ayudará a disminuir keyes incomodidad con el paso del [place]  Busque atención médica de inmediato si:  Usted tiene dolor severo que le impide moverse  Comuníquese con keyes médico si:  Keyes dolor empeora  Usted tiene un dolor nuevo  Usted tiene preguntas o inquietudes acerca de keyes afección, cuidado o programa de ejercicios  Josseline keyes ejercicios para la espalda superior de forma cintron: Pregúntele a keyes médico cuáles de los siguientes ejercicios son mejores para usted y con cuánta frecuencia hacerlos  Giros de jimi: Siéntese en kavon silla o póngase de pie  Incline keyes mentón hacia el pecho y gire la jimi hacia la derecha  Keyes oreja debe quedar por encima de keyes hombro  Mantenga esta posición por 5 segundos  Gire la jimi para atrás [employer] y a la izquierda  Keyes oreja debe quedar sobre keyes hombro tricia  Mantenga esta posición por 5 segundos  Luego, gire keyes Bairon Bucker atrás lentamente en el sentido de las manecillas del [place] [address] y repita 3 veces  Josseline 3 repeticiones de giros de [place]  Retracción escapular: Siéntese o póngase de pie con los brazos a los lados  Retraiga deepti omóplatos y sostenga por 3 segundos  Relaje y repita 3 veces  Estiramiento pectoral: Párese bajo el clare de [place]   Levante deepti estuardo y colóquelas en cada lado del clare de la aysha o pared y un poquito más [place] de keyes jimi  Inclínese hacia adelante lentamente hasta que sienta un estiramiento suave  Sostenga está posición por 15 segundos  Repita 3 veces o según indicaciones  Ejercicio del hazel y el camello: Coloque deepti estuardo y rodillas sobre el piso  Arquee keyes espalda [employer] [facility], hacia el techo y [place]  Arquee keyes gregg dorsal lo más posible  Sostenga está posición por 5 segundos  Levante keyes jimi hacia arriba y baje keyes pecho hacia el piso  Sostenga está posición por 5 segundos  Josseline 3 series o dar se le indique  Ejercicio pájaro ofe: Coloque deepti estuardo y rodillas sobre el piso  Mantenga deepti muñecas directamente debajo de deepti hombros y deepti rodillas directamente debajo de deepti caderas  Contraiga keyes ombligo hacia adentro en dirección a keyes [place]  No estire ni arquee keyes espalda  Ponga tensos deepti músculos abdominales  Levante un brazo extendido para que se alinee con keyes jimi  Luego, levante la pierna opuesta a keyes brazo  Mantenga esta posición por 15 segundos  Baje keyes Devoria Hotter y pierna lentamente y [place] de lado  Josseline 5 series  © Copyright [employer] 2022 Information is for End User's use only and may not be sold, redistributed or otherwise used for commercial purposes  All illustrations and images included in CareNotes® are the copyrighted property of A D A M , Inc  or [address] [place] [address] es sólo para uso en educación  Keyes intención no es darle un consejo médico sobre enfermedades o tratamientos  Colsulte con keyes Charna Heckler farmacéutico antes de seguir cualquier régimen médico para saber si es seguro y efectivo para usted

## 2023-01-10 PROBLEM — Z02.4 DRIVER'S PERMIT PE (PHYSICAL EXAMINATION): Status: RESOLVED | Noted: 2022-11-11 | Resolved: 2023-01-10

## 2023-01-25 ENCOUNTER — TELEPHONE (OUTPATIENT)
Dept: FAMILY MEDICINE CLINIC | Facility: CLINIC | Age: 58
End: 2023-01-25

## 2023-01-25 NOTE — TELEPHONE ENCOUNTER
Pt called nurse line requesting a referral been faxed to [facility] pulmonology office  Referral was faxed and fax confirmation received       Fax# : [phone]

## 2023-02-07 ENCOUNTER — OFFICE VISIT (OUTPATIENT)
Dept: DENTISTRY | Facility: CLINIC | Age: 58
End: 2023-02-07

## 2023-02-07 VITALS — HEART RATE: 84 BPM | TEMPERATURE: 98.7 F | SYSTOLIC BLOOD PRESSURE: 113 MMHG | DIASTOLIC BLOOD PRESSURE: 77 MMHG

## 2023-02-07 DIAGNOSIS — K02.9 CARIES: Primary | ICD-10-CM

## 2023-02-07 NOTE — PROGRESS NOTES
Oral Surgery: #29 EXT    Adriano Saenz presents for Ext #29    ASA Type 2 significant for HTN, Asthma, hyperlipidemia and hypothyroidism  [address], patient denies any changes  Obtained a direct and personal consent  Risks and complications were explained  Pt agreed and consented  Consent scanned in doc center  Pre-Op BP WNL  Could not find patient's radiographs that matched oral condition  Took new PA of #29 on new chart  Universal Protocol    Other Assisting Provider: Yes, Kaye (assistant)    Verbal consent obtained? YES  Written consent obtained? YES    Risks, benefits and alternatives discussed?: YES    Consent given by: Patient Adriano Saenz)    Time Out  Immediately prior to the procedure a time out was called: YES    Time Out:  10:30am    A time out verifies correct patient, procedure, equipment, support staff and site/side marked as required  Patient states understanding of procedure being performed: YES    Patient's understanding of procedure matches consent: YES    Procedure consent matches procedure scheduled: YES    Test results available and properly labeled: N/A    Site  Verified with the patient  YES    Radiology Images displayed and confirmed  If images not available, report reviewed:  YES    Required items - Required blood products, implants, devices and special equipment available: YES    Patient identity confirmed:  YES    Administered 1 carpule of 2 % Lidocaine w/ 1:100,000 epi via Mental nerve block and 0 5 carpules of 4% Septocaine with 1:100,000 epi via local infiltration  Adequate anesthesia obtained, reflected gingiva, elevated, and extracted #29  Socket irrigated, and 4 0 chromic gut sutures placed  Upon dismissal, patient received POI,  gauze, and RX: OTC pain medicine  NV: Prophy  Nnv: Mandibular anterior restorations

## 2023-02-20 DIAGNOSIS — I10 PRIMARY HYPERTENSION: ICD-10-CM

## 2023-02-20 DIAGNOSIS — E78.5 HYPERLIPIDEMIA, UNSPECIFIED HYPERLIPIDEMIA TYPE: ICD-10-CM

## 2023-02-20 RX ORDER — ATORVASTATIN CALCIUM 20 MG/1
TABLET, FILM COATED ORAL
Qty: 30 TABLET | Refills: 3 | Status: SHIPPED | OUTPATIENT
Start: 2023-02-20

## 2023-02-20 RX ORDER — AMLODIPINE BESYLATE 10 MG/1
TABLET ORAL
Qty: 30 TABLET | Refills: 3 | Status: SHIPPED | OUTPATIENT
Start: 2023-02-20

## 2023-02-20 RX ORDER — ENALAPRIL MALEATE 10 MG/1
TABLET ORAL
Qty: 30 TABLET | Refills: 3 | Status: SHIPPED | OUTPATIENT
Start: 2023-02-20

## 2023-03-21 DIAGNOSIS — J45.909 UNCOMPLICATED ASTHMA, UNSPECIFIED ASTHMA SEVERITY, UNSPECIFIED WHETHER PERSISTENT: ICD-10-CM

## 2023-03-22 RX ORDER — FLUTICASONE PROPIONATE 50 MCG
1 SPRAY, SUSPENSION (ML) NASAL DAILY
Qty: 16 G | Refills: 3 | OUTPATIENT
Start: 2023-03-22

## 2023-03-22 RX ORDER — BUDESONIDE AND FORMOTEROL FUMARATE DIHYDRATE 80; 4.5 UG/1; UG/1
2 AEROSOL RESPIRATORY (INHALATION) 2 TIMES DAILY PRN
OUTPATIENT
Start: 2023-03-22

## 2023-03-27 ENCOUNTER — OFFICE VISIT (OUTPATIENT)
Dept: DENTISTRY | Facility: CLINIC | Age: 58
End: 2023-03-27

## 2023-03-27 VITALS — DIASTOLIC BLOOD PRESSURE: 76 MMHG | HEART RATE: 87 BPM | TEMPERATURE: 97.6 F | SYSTOLIC BLOOD PRESSURE: 122 MMHG

## 2023-03-27 DIAGNOSIS — Z01.21 ENCOUNTER FOR DENTAL EXAMINATION AND CLEANING WITH ABNORMAL FINDINGS: Primary | ICD-10-CM

## 2023-03-27 NOTE — DENTAL PROCEDURE DETAILS
ASA  II  Pain - 0  Reviewed M/DH  I-PAD Greenlandic translation -  # [id number] - 15 min - discuss next appt tx plan    Prophylaxis completed with ultrasonic  and hand instrumentation  ---Lt calc and plaque  --- Soft plaque removed and supragingival calculus removed from lower anteriors  ---Polished with prophy cup and paste     ---Flossed and provided Oral Health Instructions     ---Demonstrated proper brushing and flossing technique     ---Patient left satisfied and ambulatory  ---Wanted prices for restorative work and dentures      Exam:  none  Referral:  none    NV1:  Rest - ant teeth - 60 min  NV2:  6mrc - 45 min w/ Green    Please quote prices for restorations and dentures
115

## 2023-04-03 ENCOUNTER — OFFICE VISIT (OUTPATIENT)
Dept: FAMILY MEDICINE CLINIC | Facility: CLINIC | Age: 58
End: 2023-04-03

## 2023-04-03 VITALS
TEMPERATURE: 98.3 F | OXYGEN SATURATION: 97 % | WEIGHT: 183 LBS | DIASTOLIC BLOOD PRESSURE: 68 MMHG | HEIGHT: 62 IN | BODY MASS INDEX: 33.68 KG/M2 | SYSTOLIC BLOOD PRESSURE: 110 MMHG | HEART RATE: 105 BPM | RESPIRATION RATE: 16 BRPM

## 2023-04-03 DIAGNOSIS — E66.9 OBESITY (BMI 30.0-34.9): ICD-10-CM

## 2023-04-03 DIAGNOSIS — R09.89 CHRONIC THROAT CLEARING: ICD-10-CM

## 2023-04-03 DIAGNOSIS — J45.909 UNCOMPLICATED ASTHMA, UNSPECIFIED ASTHMA SEVERITY, UNSPECIFIED WHETHER PERSISTENT: Primary | ICD-10-CM

## 2023-04-03 DIAGNOSIS — J45.909 UNCOMPLICATED ASTHMA, UNSPECIFIED ASTHMA SEVERITY, UNSPECIFIED WHETHER PERSISTENT: ICD-10-CM

## 2023-04-03 RX ORDER — METHYLPREDNISOLONE 4 MG/1
TABLET ORAL
Qty: 21 EACH | Refills: 0 | Status: SHIPPED | OUTPATIENT
Start: 2023-04-03

## 2023-04-03 RX ORDER — METHYLPREDNISOLONE 4 MG/1
TABLET ORAL
Qty: 21 EACH | Refills: 0 | Status: SHIPPED | OUTPATIENT
Start: 2023-04-03 | End: 2023-04-03 | Stop reason: SDUPTHER

## 2023-04-03 NOTE — ASSESSMENT & PLAN NOTE
- Patient educated on the importance of weight loss and benefits of portion control and dieting   - Patient also educated on the importance of exercise  Encouraged to walk 30 min at least 5 times a week     - Refereal to bariatrics for weight management  - Additional information about obesity and weight loss provided in AVS

## 2023-04-03 NOTE — ASSESSMENT & PLAN NOTE
Symptoms uncontrolled at this time  Patient reports symptoms of chest wall and upper back tenderness as well as weakness, likely 2/2 accessory muscle use  Patient reports to using albuterol 4-5 times per day  Patient initiated on Symbicort at last visit, which she reports to using 2-3 times per day  No PFTs on file, PFTs ordered at last visit  Patient reminded to call central scheduling to book appointment  Patient is never smoker  Will prescribe medrol dose pack  Follow up in 2 weeks or sooner if needed

## 2023-04-03 NOTE — ASSESSMENT & PLAN NOTE
"Patient was previously following with ENT at [facility] in the [place] for \"vocal cord paralysis\" and \"chronic throat clearing\" as per chart review  Will refer to ENT    "

## 2023-04-03 NOTE — PATIENT INSTRUCTIONS
Call central scheduling to book Lung Function Test, Weight management appointment, ENT appointment, and ECHO - [phone]

## 2023-04-03 NOTE — PROGRESS NOTES
"Name: Bear Davis      : 1965      MRN: [id number]  Encounter Provider: John Allred MD  Encounter Date: 4/3/2023   Encounter department: [address] [place] [address]     1  Uncomplicated asthma, unspecified asthma severity, unspecified whether persistent  Assessment & Plan:  Symptoms uncontrolled at this time  Patient reports symptoms of chest wall and upper back tenderness as well as weakness, likely 2/2 accessory muscle use  Patient reports to using albuterol 4-5 times per day  Patient initiated on Symbicort at last visit, which she reports to using 2-3 times per day  No PFTs on file, PFTs ordered at last visit  Patient reminded to call central scheduling to book appointment  Patient is never smoker  Will prescribe medrol dose pack  Follow up in 2 weeks or sooner if needed  Orders:  -     methylPREDNISolone 4 MG tablet therapy pack; Use as directed on package    2  Obesity (BMI 30 0-34  9)  Assessment & Plan:  - Patient educated on the importance of weight loss and benefits of portion control and dieting   - Patient also educated on the importance of exercise  Encouraged to walk 30 min at least 5 times a week  - Refereal to bariatrics for weight management  - Additional information about obesity and weight loss provided in AVS    Orders:  -     Ambulatory Referral to Weight Management; Future    3  Chronic throat clearing  Assessment & Plan:  Patient was previously following with ENT at [facility] in the [place] for \"vocal cord paralysis\" and \"chronic throat clearing\" as per chart review  Will refer to ENT  Orders:  -     Ambulatory Referral to Otolaryngology; Future       Patient is exclusively Iraqi-speaking  [facility] utilized for the entirety of this visit (Interpretor # [id number])  Patient verbalizes understanding of assessment and agrees with the plan      Subjective     This is a very pleasant 65 y o  female who " presents to the clinic for management of their chronic medical conditions  Patient's medical conditions are stable unless noted otherwise above  Patient has not had any recent hospitalizations, or medical emergencies since last visit  Patient has no further complaints other than what is mentioned in the ROS  Review of Systems   Constitutional: Negative for chills and fever  HENT: Negative for congestion, ear pain, rhinorrhea and sinus pain  Eyes: Negative for visual disturbance  Respiratory: Positive for chest tightness, shortness of breath and wheezing  Cardiovascular: Negative for chest pain and palpitations  Gastrointestinal: Negative for abdominal pain, constipation, diarrhea and vomiting  Endocrine: Negative for polyuria  Genitourinary: Negative for dysuria  Musculoskeletal: Negative for arthralgias and myalgias  Neurological: Negative for dizziness, syncope and light-headedness  Psychiatric/Behavioral: Negative for hallucinations, self-injury and suicidal ideas  Past Medical History:   Diagnosis Date   • Hypertension      Past Surgical History:   Procedure Laterality Date   • APPENDECTOMY     • HYSTERECTOMY     • NOSE SURGERY     • THYROID SURGERY       No family history on file  Social History     Socioeconomic History   • Marital status:       Spouse name: None   • Number of children: None   • Years of education: None   • Highest education level: None   Occupational History   • None   Tobacco Use   • Smoking status: Never   • Smokeless tobacco: Never   Substance and Sexual Activity   • Alcohol use: Not Currently   • Drug use: Never   • Sexual activity: None   Other Topics Concern   • None   Social History Narrative   • None     Social Determinants of Health     Financial Resource Strain: Low Risk    • Difficulty of Paying Living Expenses: Not hard at all   Food Insecurity: No Food Insecurity   • Worried About [address] in the Last Year: Never true   • Ran "Out of Food in the Last Year: Never true   Transportation Needs: No Transportation Needs   • Lack of Transportation (Medical): No   • Lack of Transportation (Non-Medical): No   Physical Activity: Not on file   Stress: Not on file   Social Connections: Not on file   Intimate Partner Violence: Not on file   Housing Stability: Not on file     Current Outpatient Medications on File Prior to Visit   Medication Sig   • albuterol (Ventolin HFA) 90 mcg/act inhaler Inhale 2 puffs every 6 (six) hours as needed for wheezing   • amLODIPine (NORVASC) 10 mg tablet TAKE ONE TABLET BY MOUTH DAILY   • atorvastatin (LIPITOR) 20 mg tablet TAKE ONE TABLET BY MOUTH DAILY   • budesonide-formoterol (Symbicort) 80-4 5 MCG/ACT inhaler Inhale 2 puffs 2 (two) times a day as needed (asthma) Rinse mouth after use  • diclofenac (VOLTAREN) 75 mg EC tablet Take 1 tablet (75 mg total) by mouth 2 (two) times a day   • enalapril (VASOTEC) 10 mg tablet TAKE ONE TABLET BY MOUTH BY MOUTH DAILY   • fluticasone (FLONASE) 50 mcg/act nasal spray 1 spray into each nostril daily   • levothyroxine 100 mcg tablet TAKE ONE TABLET BY MOUTH DAILY     No Known Allergies    There is no immunization history on file for this patient  Objective     /68 (BP Location: Left arm, Patient Position: Sitting, Cuff Size: Standard)   Pulse 105   Temp 98 3 °F (36 8 °C) (Temporal)   Resp 16   Ht 5' 2\" (1 575 m)   Wt 83 kg (183 lb)   SpO2 97%   BMI 33 47 kg/m²     Physical Exam  Constitutional:       Appearance: Normal appearance  HENT:      Head: Normocephalic and atraumatic  Nose: Nose normal    Eyes:      Conjunctiva/sclera: Conjunctivae normal    Cardiovascular:      Rate and Rhythm: Normal rate  Pulmonary:      Effort: Pulmonary effort is normal       Breath sounds: Wheezing (mild inspiratory wheezing heard throughout bilateral upper airways) present  Musculoskeletal:         General: Normal range of motion        Cervical back: Normal range of " motion  Skin:     General: Skin is warm and dry  Neurological:      Mental Status: She is alert and oriented to person, place, and time     Psychiatric:         Behavior: Behavior normal        Aparna Yuen MD

## 2023-04-04 RX ORDER — METHYLPREDNISOLONE 4 MG/1
TABLET ORAL
Qty: 21 EACH | Refills: 0 | Status: SHIPPED | OUTPATIENT
Start: 2023-04-04

## 2023-04-20 ENCOUNTER — TELEPHONE (OUTPATIENT)
Dept: FAMILY MEDICINE CLINIC | Facility: CLINIC | Age: 58
End: 2023-04-20

## 2023-04-20 DIAGNOSIS — J45.909 UNCOMPLICATED ASTHMA, UNSPECIFIED ASTHMA SEVERITY, UNSPECIFIED WHETHER PERSISTENT: ICD-10-CM

## 2023-04-20 PROBLEM — K21.9 GASTROESOPHAGEAL REFLUX DISEASE WITHOUT ESOPHAGITIS: Status: ACTIVE | Noted: 2023-04-20

## 2023-04-20 PROBLEM — R05.3 CHRONIC COUGH: Status: ACTIVE | Noted: 2023-04-20

## 2023-04-20 PROBLEM — M94.0 COSTOCHONDRITIS: Status: ACTIVE | Noted: 2023-04-20

## 2023-04-20 NOTE — TELEPHONE ENCOUNTER
Hi, I'm calling from [facility] calling regarding a patient or [place] bathroom  Date of Birth [birth year]  Calling because her pantoprazole is not covered by the insurance, but the omeprazole is  And then we wanted to know if it was OK to change her medication from pantoprazole to omeprazole  Again, this is the [facility] pharmacy calling regarding patients  Matilde Seen [phone]  We're just calling you to let you know that the pantoprazole wasn't was not covered by the insurance but the omeprazole was And if it was OK to change the medication from the pantoprazole to the omeprazole  Phone number is [phone]  Thank you

## 2023-04-23 DIAGNOSIS — K21.9 GASTROESOPHAGEAL REFLUX DISEASE WITHOUT ESOPHAGITIS: Primary | ICD-10-CM

## 2023-04-23 RX ORDER — OMEPRAZOLE 40 MG/1
40 CAPSULE, DELAYED RELEASE ORAL DAILY
Qty: 90 CAPSULE | Refills: 0 | Status: SHIPPED | OUTPATIENT
Start: 2023-04-23

## 2023-04-23 RX ORDER — BUDESONIDE AND FORMOTEROL FUMARATE DIHYDRATE 80; 4.5 UG/1; UG/1
2 AEROSOL RESPIRATORY (INHALATION) 2 TIMES DAILY PRN
Qty: 10.2 G | Refills: 0 | Status: SHIPPED | OUTPATIENT
Start: 2023-04-23

## 2023-04-23 RX ORDER — FLUTICASONE PROPIONATE 50 MCG
1 SPRAY, SUSPENSION (ML) NASAL DAILY
Qty: 16 G | Refills: 3 | Status: SHIPPED | OUTPATIENT
Start: 2023-04-23

## 2023-05-05 ENCOUNTER — OFFICE VISIT (OUTPATIENT)
Dept: DENTISTRY | Facility: CLINIC | Age: 58
End: 2023-05-05

## 2023-05-05 VITALS — SYSTOLIC BLOOD PRESSURE: 147 MMHG | DIASTOLIC BLOOD PRESSURE: 104 MMHG | HEART RATE: 87 BPM

## 2023-05-05 DIAGNOSIS — K08.531 TOOTH FRACTURE WITH LOSS OF RESTORATIVE MATERIAL: Primary | ICD-10-CM

## 2023-05-05 NOTE — PROGRESS NOTES
Composite Filling    Galindo Argueta presents for composite filling  PMH reviewed, no changes  She is getting a surgery (possible tracheotomy on May 19  Discussed with patient need for RCT if pulp exposure occurs or in future if pulp is inflamed  Pt understands and consents  Took PA of anterior mandibular teeth today due to them not being in her chart  Applied topical benzocaine, administered 0 5 carps 4% articaine 1:100k epi via local infiltration of #26  Prepped tooth #26 for a class 4 restoration involving incisal angle with 245 carbide on high speed  Used fender wedge during prep  Caries removed with round carbide on slow speed  Placed clear mylar matrix  Isolation with cotton rolls and dri-angles  Etched with 37% H2PO4, rinse, dry  Applied Adhese with 20 second scrub once, gentle air dry and light cured for 10s  Restored with Tetric bulk erlin shade A2 and light cured  Refined with finishing burs, polished with enhance point  Verified occlusion and contacts  Pt left satisfied  Nv: Pre-colbert impressions for Maxillary CD and Mandibular cast metal partial with new residents

## 2023-05-17 ENCOUNTER — TELEPHONE (OUTPATIENT)
Dept: FAMILY MEDICINE CLINIC | Facility: CLINIC | Age: 58
End: 2023-05-17

## 2023-05-17 NOTE — TELEPHONE ENCOUNTER
PCP [address]  [facility] FORM RECEIVED VIA FAX AND PLACED IN PCP FOLDER TO BE DELIVERED AT ASSIGNED TIMES      [facility]   Surgical Clearance Request

## 2023-05-23 NOTE — TELEPHONE ENCOUNTER
CALLED PATIENT FAMILY MEMBER STATED PATIENT WAS NOT ABLE TO TALK SHE JUST HAD SURGERY   I AM GOING TO DISCARD FORM

## 2023-06-02 PROBLEM — R09.89 CHRONIC THROAT CLEARING: Status: RESOLVED | Noted: 2023-04-03 | Resolved: 2023-06-02

## 2023-06-07 DIAGNOSIS — J45.909 UNCOMPLICATED ASTHMA, UNSPECIFIED ASTHMA SEVERITY, UNSPECIFIED WHETHER PERSISTENT: ICD-10-CM

## 2023-06-09 DIAGNOSIS — M94.0 COSTOCHONDRITIS: ICD-10-CM

## 2023-06-09 RX ORDER — ALBUTEROL SULFATE 90 UG/1
2 AEROSOL, METERED RESPIRATORY (INHALATION) EVERY 6 HOURS PRN
Qty: 18 G | Refills: 5 | Status: SHIPPED | OUTPATIENT
Start: 2023-06-09

## 2023-06-15 ENCOUNTER — TELEPHONE (OUTPATIENT)
Dept: FAMILY MEDICINE CLINIC | Facility: CLINIC | Age: 58
End: 2023-06-15

## 2023-06-15 NOTE — TELEPHONE ENCOUNTER
Michael Gandih  from [facility] was calling to see if the patient was still a patient here in our office so that we can make an appointment for her  If daughter, Jennyfer Cooper calls please help schedule appointment

## 2023-06-16 DIAGNOSIS — I10 PRIMARY HYPERTENSION: ICD-10-CM

## 2023-06-16 DIAGNOSIS — E78.5 HYPERLIPIDEMIA, UNSPECIFIED HYPERLIPIDEMIA TYPE: ICD-10-CM

## 2023-06-18 RX ORDER — ATORVASTATIN CALCIUM 20 MG/1
TABLET, FILM COATED ORAL
Qty: 30 TABLET | Refills: 3 | Status: SHIPPED | OUTPATIENT
Start: 2023-06-18

## 2023-06-18 RX ORDER — AMLODIPINE BESYLATE 10 MG/1
TABLET ORAL
Qty: 30 TABLET | Refills: 3 | Status: SHIPPED | OUTPATIENT
Start: 2023-06-18

## 2023-06-18 RX ORDER — ENALAPRIL MALEATE 10 MG/1
TABLET ORAL
Qty: 30 TABLET | Refills: 3 | Status: SHIPPED | OUTPATIENT
Start: 2023-06-18

## 2023-06-19 ENCOUNTER — APPOINTMENT (EMERGENCY)
Dept: RADIOLOGY | Facility: HOSPITAL | Age: 58
End: 2023-06-19
Payer: COMMERCIAL

## 2023-06-19 ENCOUNTER — HOSPITAL ENCOUNTER (EMERGENCY)
Facility: HOSPITAL | Age: 58
Discharge: HOME | End: 2023-06-20
Attending: EMERGENCY MEDICINE
Payer: COMMERCIAL

## 2023-06-19 ENCOUNTER — APPOINTMENT (EMERGENCY)
Dept: CT IMAGING | Facility: HOSPITAL | Age: 58
End: 2023-06-19
Payer: COMMERCIAL

## 2023-06-19 DIAGNOSIS — N30.90 CYSTITIS: ICD-10-CM

## 2023-06-19 DIAGNOSIS — Z76.89 ENCOUNTER TO ESTABLISH CARE: ICD-10-CM

## 2023-06-19 DIAGNOSIS — N39.0 UTI (URINARY TRACT INFECTION): Primary | ICD-10-CM

## 2023-06-19 LAB
ALBUMIN SERPL BCP-MCNC: 4.2 G/DL (ref 3.5–5)
ALP SERPL-CCNC: 63 U/L (ref 34–104)
ALT SERPL W P-5'-P-CCNC: 14 U/L (ref 7–52)
ANION GAP SERPL CALCULATED.3IONS-SCNC: 11 MMOL/L (ref 4–13)
APTT PPP: 32 SECONDS (ref 23–37)
AST SERPL W P-5'-P-CCNC: 16 U/L (ref 13–39)
BACTERIA UR QL AUTO: ABNORMAL /HPF
BASOPHILS # BLD AUTO: 0.02 THOUSANDS/ÂΜL (ref 0–0.1)
BASOPHILS NFR BLD AUTO: 0 % (ref 0–1)
BILIRUB SERPL-MCNC: 0.55 MG/DL (ref 0.2–1)
BILIRUB UR QL STRIP: NEGATIVE
BUN SERPL-MCNC: 18 MG/DL (ref 5–25)
CALCIUM SERPL-MCNC: 9.4 MG/DL (ref 8.4–10.2)
CARDIAC TROPONIN I PNL SERPL HS: 2 NG/L
CHLORIDE SERPL-SCNC: 101 MMOL/L (ref 96–108)
CLARITY UR: ABNORMAL
CO2 SERPL-SCNC: 24 MMOL/L (ref 21–32)
COLOR UR: ABNORMAL
CREAT SERPL-MCNC: 0.82 MG/DL (ref 0.6–1.3)
EOSINOPHIL # BLD AUTO: 0.06 THOUSAND/ÂΜL (ref 0–0.61)
EOSINOPHIL NFR BLD AUTO: 1 % (ref 0–6)
ERYTHROCYTE [DISTWIDTH] IN BLOOD BY AUTOMATED COUNT: 13.2 % (ref 11.6–15.1)
EXT PREGNANCY TEST URINE: NEGATIVE
EXT. CONTROL: NORMAL
GFR SERPL CREATININE-BSD FRML MDRD: 80 ML/MIN/1.73SQ M
GLUCOSE SERPL-MCNC: 119 MG/DL (ref 65–140)
GLUCOSE UR STRIP-MCNC: NEGATIVE MG/DL
HCT VFR BLD AUTO: 39.1 % (ref 34.8–46.1)
HGB BLD-MCNC: 12.9 G/DL (ref 11.5–15.4)
HGB UR QL STRIP.AUTO: 250
IMM GRANULOCYTES # BLD AUTO: 0.03 THOUSAND/UL (ref 0–0.2)
IMM GRANULOCYTES NFR BLD AUTO: 0 % (ref 0–2)
INR PPP: 0.95 (ref 0.84–1.19)
KETONES UR STRIP-MCNC: NEGATIVE MG/DL
LACTATE SERPL-SCNC: 1 MMOL/L (ref 0.5–2)
LEUKOCYTE ESTERASE UR QL STRIP: 500
LYMPHOCYTES # BLD AUTO: 1.46 THOUSANDS/ÂΜL (ref 0.6–4.47)
LYMPHOCYTES NFR BLD AUTO: 18 % (ref 14–44)
MAGNESIUM SERPL-MCNC: 1.9 MG/DL (ref 1.9–2.7)
MCH RBC QN AUTO: 30.9 PG (ref 26.8–34.3)
MCHC RBC AUTO-ENTMCNC: 33 G/DL (ref 31.4–37.4)
MCV RBC AUTO: 94 FL (ref 82–98)
MONOCYTES # BLD AUTO: 0.69 THOUSAND/ÂΜL (ref 0.17–1.22)
MONOCYTES NFR BLD AUTO: 9 % (ref 4–12)
NEUTROPHILS # BLD AUTO: 5.86 THOUSANDS/ÂΜL (ref 1.85–7.62)
NEUTS SEG NFR BLD AUTO: 72 % (ref 43–75)
NITRITE UR QL STRIP: NEGATIVE
NON-SQ EPI CELLS URNS QL MICRO: ABNORMAL /HPF
NRBC BLD AUTO-RTO: 0 /100 WBCS
PH UR STRIP.AUTO: 7 [PH]
PLATELET # BLD AUTO: 265 THOUSANDS/UL (ref 149–390)
PMV BLD AUTO: 9.5 FL (ref 8.9–12.7)
POTASSIUM SERPL-SCNC: 3.9 MMOL/L (ref 3.5–5.3)
PROCALCITONIN SERPL-MCNC: <0.05 NG/ML
PROT SERPL-MCNC: 7.6 G/DL (ref 6.4–8.4)
PROT UR STRIP-MCNC: ABNORMAL MG/DL
PROTHROMBIN TIME: 13 SECONDS (ref 11.6–14.5)
RBC # BLD AUTO: 4.18 MILLION/UL (ref 3.81–5.12)
RBC #/AREA URNS AUTO: ABNORMAL /HPF
SODIUM SERPL-SCNC: 136 MMOL/L (ref 135–147)
SP GR UR STRIP.AUTO: 1.01 (ref 1–1.04)
UROBILINOGEN UA: NEGATIVE MG/DL
WBC # BLD AUTO: 8.12 THOUSAND/UL (ref 4.31–10.16)
WBC #/AREA URNS AUTO: ABNORMAL /HPF

## 2023-06-19 PROCEDURE — 36415 COLL VENOUS BLD VENIPUNCTURE: CPT | Performed by: EMERGENCY MEDICINE

## 2023-06-19 PROCEDURE — 87086 URINE CULTURE/COLONY COUNT: CPT | Performed by: EMERGENCY MEDICINE

## 2023-06-19 PROCEDURE — 0241U HB NFCT DS VIR RESP RNA 4 TRGT: CPT | Performed by: EMERGENCY MEDICINE

## 2023-06-19 PROCEDURE — 93005 ELECTROCARDIOGRAM TRACING: CPT

## 2023-06-19 PROCEDURE — 85610 PROTHROMBIN TIME: CPT | Performed by: EMERGENCY MEDICINE

## 2023-06-19 PROCEDURE — 71045 X-RAY EXAM CHEST 1 VIEW: CPT

## 2023-06-19 PROCEDURE — 83605 ASSAY OF LACTIC ACID: CPT | Performed by: EMERGENCY MEDICINE

## 2023-06-19 PROCEDURE — 96361 HYDRATE IV INFUSION ADD-ON: CPT

## 2023-06-19 PROCEDURE — 83735 ASSAY OF MAGNESIUM: CPT | Performed by: EMERGENCY MEDICINE

## 2023-06-19 PROCEDURE — 84145 PROCALCITONIN (PCT): CPT | Performed by: EMERGENCY MEDICINE

## 2023-06-19 PROCEDURE — 85025 COMPLETE CBC W/AUTO DIFF WBC: CPT | Performed by: EMERGENCY MEDICINE

## 2023-06-19 PROCEDURE — 99285 EMERGENCY DEPT VISIT HI MDM: CPT

## 2023-06-19 PROCEDURE — 96375 TX/PRO/DX INJ NEW DRUG ADDON: CPT

## 2023-06-19 PROCEDURE — 87040 BLOOD CULTURE FOR BACTERIA: CPT | Performed by: EMERGENCY MEDICINE

## 2023-06-19 PROCEDURE — 81025 URINE PREGNANCY TEST: CPT | Performed by: EMERGENCY MEDICINE

## 2023-06-19 PROCEDURE — 74177 CT ABD & PELVIS W/CONTRAST: CPT

## 2023-06-19 PROCEDURE — 81001 URINALYSIS AUTO W/SCOPE: CPT | Performed by: EMERGENCY MEDICINE

## 2023-06-19 PROCEDURE — 80053 COMPREHEN METABOLIC PANEL: CPT | Performed by: EMERGENCY MEDICINE

## 2023-06-19 PROCEDURE — 81003 URINALYSIS AUTO W/O SCOPE: CPT | Performed by: EMERGENCY MEDICINE

## 2023-06-19 PROCEDURE — 87186 SC STD MICRODIL/AGAR DIL: CPT | Performed by: EMERGENCY MEDICINE

## 2023-06-19 PROCEDURE — G1004 CDSM NDSC: HCPCS

## 2023-06-19 PROCEDURE — 85730 THROMBOPLASTIN TIME PARTIAL: CPT | Performed by: EMERGENCY MEDICINE

## 2023-06-19 PROCEDURE — 87077 CULTURE AEROBIC IDENTIFY: CPT | Performed by: EMERGENCY MEDICINE

## 2023-06-19 PROCEDURE — 84484 ASSAY OF TROPONIN QUANT: CPT | Performed by: EMERGENCY MEDICINE

## 2023-06-19 RX ORDER — ACETAMINOPHEN 325 MG/1
975 TABLET ORAL ONCE
Status: COMPLETED | OUTPATIENT
Start: 2023-06-19 | End: 2023-06-19

## 2023-06-19 RX ORDER — KETOROLAC TROMETHAMINE 30 MG/ML
15 INJECTION, SOLUTION INTRAMUSCULAR; INTRAVENOUS ONCE
Status: COMPLETED | OUTPATIENT
Start: 2023-06-19 | End: 2023-06-19

## 2023-06-19 RX ADMIN — IOHEXOL 100 ML: 350 INJECTION, SOLUTION INTRAVENOUS at 23:11

## 2023-06-19 RX ADMIN — KETOROLAC TROMETHAMINE 15 MG: 30 INJECTION, SOLUTION INTRAMUSCULAR at 23:43

## 2023-06-19 RX ADMIN — SODIUM CHLORIDE 1000 ML: 0.9 INJECTION, SOLUTION INTRAVENOUS at 22:24

## 2023-06-19 RX ADMIN — ACETAMINOPHEN 975 MG: 325 TABLET ORAL at 23:44

## 2023-06-20 VITALS
OXYGEN SATURATION: 94 % | SYSTOLIC BLOOD PRESSURE: 103 MMHG | TEMPERATURE: 99.8 F | HEART RATE: 108 BPM | BODY MASS INDEX: 32.7 KG/M2 | WEIGHT: 178.8 LBS | DIASTOLIC BLOOD PRESSURE: 60 MMHG | RESPIRATION RATE: 22 BRPM

## 2023-06-20 LAB
ATRIAL RATE: 123 BPM
FLUAV RNA RESP QL NAA+PROBE: NEGATIVE
FLUBV RNA RESP QL NAA+PROBE: NEGATIVE
P AXIS: 29 DEGREES
PR INTERVAL: 164 MS
QRS AXIS: 23 DEGREES
QRSD INTERVAL: 70 MS
QT INTERVAL: 310 MS
QTC INTERVAL: 443 MS
RSV RNA RESP QL NAA+PROBE: NEGATIVE
SARS-COV-2 RNA RESP QL NAA+PROBE: NEGATIVE
T WAVE AXIS: 20 DEGREES
VENTRICULAR RATE: 123 BPM

## 2023-06-20 PROCEDURE — 93010 ELECTROCARDIOGRAM REPORT: CPT | Performed by: INTERNAL MEDICINE

## 2023-06-20 PROCEDURE — 96365 THER/PROPH/DIAG IV INF INIT: CPT

## 2023-06-20 RX ORDER — CEFTRIAXONE 2 G/50ML
2000 INJECTION, SOLUTION INTRAVENOUS ONCE
Status: COMPLETED | OUTPATIENT
Start: 2023-06-20 | End: 2023-06-20

## 2023-06-20 RX ORDER — CEFUROXIME AXETIL 500 MG/1
500 TABLET ORAL EVERY 12 HOURS SCHEDULED
Qty: 14 TABLET | Refills: 0 | Status: SHIPPED | OUTPATIENT
Start: 2023-06-20 | End: 2023-06-20 | Stop reason: SDUPTHER

## 2023-06-20 RX ORDER — CEFUROXIME AXETIL 500 MG/1
500 TABLET ORAL EVERY 12 HOURS SCHEDULED
Qty: 14 TABLET | Refills: 0 | Status: SHIPPED | OUTPATIENT
Start: 2023-06-20 | End: 2023-06-27

## 2023-06-20 RX ADMIN — CEFTRIAXONE 2000 MG: 2 INJECTION, SOLUTION INTRAVENOUS at 00:18

## 2023-06-20 NOTE — ED PROVIDER NOTES
History  Chief Complaint   Patient presents with   • Pelvic Pain     Patient c/o pelvic pain, pain with urination, blood in urine and fever for three days  Motrin and tylenol with no relief  HPI    66 yo F hx of HTN, Trach placement May 19th for vocal cord dysfunction per patient at [facility], asthma, HLD, presents to ed for eval of pelvic pain and dysuria  Patient has been having symptoms for past three days  Chills  + nausea  No emesis  No diarrhea, states she has some constipation  Hematuria  No vaginal discharge  No cp or sob  No other complaints on ros  Prior to Admission Medications   Prescriptions Last Dose Informant Patient Reported? Taking?    Diclofenac Sodium (VOLTAREN) 1 %   No No   Sig: APPLY 2 G TOPICALLY 4 (FOUR) TIMES A DAY   albuterol (2 5 mg/3 mL) 0 083 % nebulizer solution   No No   Sig: Take 3 mL (2 5 mg total) by nebulization every 6 (six) hours as needed for wheezing or shortness of breath   albuterol (PROVENTIL HFA,VENTOLIN HFA) 90 mcg/act inhaler   No No   Sig: INHALE 2 PUFFS EVERY 6 (SIX) HOURS AS NEEDED FOR WHEEZING   amLODIPine (NORVASC) 10 mg tablet   No No   Sig: TAKE ONE TABLET BY MOUTH DAILY   atorvastatin (LIPITOR) 20 mg tablet   No No   Sig: TAKE ONE TABLET BY MOUTH DAILY   budesonide-formoterol (SYMBICORT) 80-4 5 MCG/ACT inhaler   No No   Sig: INHALE 2 PUFFS 2 (TWO) TIMES A DAY AS NEEDED (ASTHMA) RINSE MOUTH AFTER USE    diclofenac (VOLTAREN) 75 mg EC tablet   No No   Sig: Take 1 tablet (75 mg total) by mouth 2 (two) times a day   enalapril (VASOTEC) 10 mg tablet   No No   Sig: TAKE ONE TABLET BY MOUTH BY MOUTH DAILY   fluticasone (FLONASE) 50 mcg/act nasal spray   No No   Si SPRAY INTO EACH NOSTRIL DAILY   levothyroxine 100 mcg tablet   No No   Sig: TAKE ONE TABLET BY MOUTH DAILY   methylPREDNISolone 4 MG tablet therapy pack   No No   Sig: Use as directed on package   methylPREDNISolone 4 MG tablet therapy pack   No No   Sig: Use as directed on package   omeprazole (PriLOSEC) 40 MG capsule   No No   Sig: Take 1 capsule (40 mg total) by mouth daily      Facility-Administered Medications: None       Past Medical History:   Diagnosis Date   • Hypertension        Past Surgical History:   Procedure Laterality Date   • APPENDECTOMY     • HYSTERECTOMY     • NOSE SURGERY     • THYROID SURGERY         History reviewed  No pertinent family history  I have reviewed and agree with the history as documented  E-Cigarette/Vaping     E-Cigarette/Vaping Substances     Social History     Tobacco Use   • Smoking status: Never   • Smokeless tobacco: Never   Substance Use Topics   • Alcohol use: Not Currently   • Drug use: Never       Review of Systems   Constitutional: Positive for chills and fever  Negative for fatigue  HENT: Negative for sore throat  Eyes: Negative for redness and visual disturbance  Respiratory: Negative for cough and shortness of breath  Cardiovascular: Negative for chest pain  Gastrointestinal: Positive for nausea  Negative for abdominal pain and diarrhea  Genitourinary: Positive for dysuria and hematuria  Negative for difficulty urinating and pelvic pain  Musculoskeletal: Negative for back pain  Skin: Negative for rash  Neurological: Negative for syncope, weakness and headaches  All other systems reviewed and are negative  Physical Exam  Physical Exam  Vitals and nursing note reviewed  Constitutional:       General: She is not in acute distress  HENT:      Head: Normocephalic and atraumatic  Right Ear: External ear normal       Left Ear: External ear normal    Eyes:      Extraocular Movements: Extraocular movements intact  Conjunctiva/sclera: Conjunctivae normal    Neck:      Comments: Trach in place, site c/d/i  Cardiovascular:      Rate and Rhythm: Regular rhythm  Tachycardia present  Heart sounds: Normal heart sounds  Pulmonary:      Effort: Pulmonary effort is normal  No respiratory distress        Breath sounds: Normal breath sounds  Abdominal:      General: Abdomen is flat  Tenderness: There is abdominal tenderness  Comments: Suprapubic region   Musculoskeletal:         General: Normal range of motion  Cervical back: Normal range of motion  Skin:     General: Skin is warm and dry  Neurological:      Mental Status: She is alert and oriented to person, place, and time  Cranial Nerves: No cranial nerve deficit  Motor: No abnormal muscle tone        Coordination: Coordination normal          Vital Signs  ED Triage Vitals [06/19/23 2205]   Temperature Pulse Respirations Blood Pressure SpO2   (!) 102 8 °F (39 3 °C) (!) 122 (!) 24 137/88 97 %      Temp Source Heart Rate Source Patient Position - Orthostatic VS BP Location FiO2 (%)   Tympanic Monitor Lying Left arm --      Pain Score       --           Vitals:    06/19/23 2205 06/19/23 2345 06/20/23 0100   BP: 137/88 127/76 103/60   Pulse: (!) 122 (!) 117 (!) 108   Patient Position - Orthostatic VS: Lying Lying Lying         Visual Acuity      ED Medications  Medications   sodium chloride 0 9 % bolus 1,000 mL (0 mL Intravenous Stopped 6/20/23 0017)   iohexol (OMNIPAQUE) 350 MG/ML injection (SINGLE-DOSE) 100 mL (100 mL Intravenous Given 6/19/23 2311)   ketorolac (TORADOL) injection 15 mg (15 mg Intravenous Given 6/19/23 2343)   acetaminophen (TYLENOL) tablet 975 mg (975 mg Oral Given 6/19/23 2344)   cefTRIAXone (ROCEPHIN) IVPB (premix in dextrose) 2,000 mg 50 mL (0 mg Intravenous Stopped 6/20/23 0048)       Diagnostic Studies  Results Reviewed     Procedure Component Value Units Date/Time    FLU/RSV/COVID - if FLU/RSV clinically relevant [[id number]]  (Normal) Collected: 06/19/23 2218    Lab Status: Final result Specimen: Nares from Nose Updated: 06/20/23 0019     SARS-CoV-2 Negative     INFLUENZA A PCR Negative     INFLUENZA B PCR Negative     RSV PCR Negative    Narrative:      FOR PEDIATRIC PATIENTS - copy/paste COVID Guidelines URL to browser: [email or web address]    SARS-CoV-2 assay is a Nucleic Acid Amplification assay intended for the  qualitative detection of nucleic acid from SARS-CoV-2 in nasopharyngeal  swabs  Results are for the presumptive identification of SARS-CoV-2 RNA  Positive results are indicative of infection with SARS-CoV-2, the virus  causing COVID-19, but do not rule out bacterial infection or co-infection  with other viruses  Laboratories within the [place] and its  territories are required to report all positive results to the appropriate  public health authorities  Negative results do not preclude SARS-CoV-2  infection and should not be used as the sole basis for treatment or other  patient management decisions  Negative results must be combined with  clinical observations, patient history, and epidemiological information  This test has not been FDA cleared or approved  This test has been authorized by FDA under an Emergency Use Authorization  (EUA)  This test is only authorized for the duration of time the  declaration that circumstances exist justifying the authorization of the  emergency use of an in vitro diagnostic tests for detection of SARS-CoV-2  virus and/or diagnosis of COVID-19 infection under section 564(b)(1) of  the Act, 21 U  S C  854KDL-1(Y)(0), unless the authorization is terminated  or revoked sooner  The test has been validated but independent review by FDA  and CLIA is pending  Test performed using [employer]: This RT-PCR assay targets N2,  a region unique to SARS-CoV-2  A conserved region in the E-gene was chosen  for pan-Sarbecovirus detection which includes SARS-CoV-2  According to CMS-2020-01-R, this platform meets the definition of high-throughput technology      Urine Microscopic [[id number]]  (Abnormal) Collected: 06/19/23 2307    Lab Status: Final result Specimen: Urine, Clean Catch Updated: 06/19/23 2337     RBC, UA Innumerable /hpf      WBC, UA 20-30 /hpf      Epithelial Cells Moderate /hpf      Bacteria, UA Moderate /hpf     Urine culture [[id number]] Collected: 06/19/23 2307    Lab Status:  In process Specimen: Urine, Clean Catch Updated: 06/19/23 2336    UA w Reflex to Microscopic w Reflex to Culture [[id number]]  (Abnormal) Collected: 06/19/23 2307    Lab Status: Final result Specimen: Urine, Clean Catch Updated: 06/19/23 2326     Color, UA Straw     Clarity, UA Cloudy     Specific [place], UA 1 010     pH, UA 7 0     Leukocytes,  0     Nitrite, UA Negative     Protein, UA 30 (1+) mg/dl      Glucose, UA Negative mg/dl      Ketones, UA Negative mg/dl      Bilirubin, UA Negative     Occult Blood,  0     UROBILINOGEN UA Negative mg/dL     POCT pregnancy, urine [[id number]]  (Normal) Resulted: 06/19/23 2308    Lab Status: Final result Updated: 06/19/23 2308     EXT Preg Test, Ur Negative     Control Valid    Procalcitonin [[id number]]  (Normal) Collected: 06/19/23 2219    Lab Status: Final result Specimen: Blood from Arm, Right Updated: 06/19/23 2256     Procalcitonin <0 05 ng/ml     HS Troponin 0hr (reflex protocol) [[id number]]  (Normal) Collected: 06/19/23 2219    Lab Status: Final result Specimen: Blood from Line, Venous Updated: 06/19/23 2255     hs TnI 0hr 2 ng/L     Comprehensive metabolic panel [[id number]] Collected: 06/19/23 2219    Lab Status: Final result Specimen: Blood from Line, Venous Updated: 06/19/23 2250     Sodium 136 mmol/L      Potassium 3 9 mmol/L      Chloride 101 mmol/L      CO2 24 mmol/L      ANION GAP 11 mmol/L      BUN 18 mg/dL      Creatinine 0 82 mg/dL      Glucose 119 mg/dL      Calcium 9 4 mg/dL      AST 16 U/L      ALT 14 U/L      Alkaline Phosphatase 63 U/L      Total Protein 7 6 g/dL      Albumin 4 2 g/dL      Total Bilirubin 0 55 mg/dL      eGFR 80 ml/min/1 73sq m     Narrative:      [place] guidelines for Chronic Kidney Disease (CKD):   •  Stage 1 with normal or high GFR (GFR > 90 mL/min/1 73 square meters)  •  Stage 2 Mild CKD (GFR = 60-89 mL/min/1 73 square meters)  •  Stage 3A Moderate CKD (GFR = 45-59 mL/min/1 73 square meters)  •  Stage 3B Moderate CKD (GFR = 30-44 mL/min/1 73 square meters)  •  Stage 4 Severe CKD (GFR = 15-29 mL/min/1 73 square meters)  •  Stage 5 End Stage CKD (GFR <15 mL/min/1 73 square meters)  Note: GFR calculation is accurate only with a steady state creatinine    Magnesium [[id number]]  (Normal) Collected: 06/19/23 2219    Lab Status: Final result Specimen: Blood from Line, Venous Updated: 06/19/23 2250     Magnesium 1 9 mg/dL     Lactic acid [[id number]]  (Normal) Collected: 06/19/23 2221    Lab Status: Final result Specimen: Blood from Line, Venous Updated: 06/19/23 2248     LACTIC ACID 1 0 mmol/L     Narrative:      Result may be elevated if tourniquet was used during collection  Blood culture #2 [[id number]] Collected: 06/19/23 2230    Lab Status:  In process Specimen: Blood from Arm, Left Updated: 06/19/23 2246    Protime-INR [[id number]]  (Normal) Collected: 06/19/23 2219    Lab Status: Final result Specimen: Blood from Arm, Right Updated: 06/19/23 2244     Protime 13 0 seconds      INR 0 95    APTT [[id number]]  (Normal) Collected: 06/19/23 2219    Lab Status: Final result Specimen: Blood from Arm, Right Updated: 06/19/23 2244     PTT 32 seconds     CBC and differential [[id number]] Collected: 06/19/23 2221    Lab Status: Final result Specimen: Blood from Line, Venous Updated: 06/19/23 2236     WBC 8 12 Thousand/uL      RBC 4 18 Million/uL      Hemoglobin 12 9 g/dL      Hematocrit 39 1 %      MCV 94 fL      MCH 30 9 pg      MCHC 33 0 g/dL      RDW 13 2 %      MPV 9 5 fL      Platelets 361 Thousands/uL      nRBC 0 /100 WBCs      Neutrophils Relative 72 %      Immat GRANS % 0 %      Lymphocytes Relative 18 %      Monocytes Relative 9 %      Eosinophils Relative 1 %      Basophils Relative 0 %      Neutrophils Absolute 5 86 Thousands/µL      Immature Grans Absolute 0 03 Thousand/uL      Lymphocytes Absolute 1 46 Thousands/µL      Monocytes Absolute 0 69 Thousand/µL      Eosinophils Absolute 0 06 Thousand/µL      Basophils Absolute 0 02 Thousands/µL     Blood culture #1 [[id number]] Collected: 06/19/23 2219    Lab Status: In process Specimen: Blood from Line, Venous Updated: 06/19/23 2229                 XR chest portable   ED Interpretation by Gunjan Littlejohn MD (06/19 2326)   No acute cardiopulmonary pathology, trach in place      CT abdomen pelvis with contrast   Final Result by Mariusz Mack DO (06/20 9500)      Mild circumferential bladder wall thickening with associated inflammatory changes adjacent to the bladder, suspicious for cystitis  There is also thickening of the left ureter and left renal collecting system suspicious for ascending infection  Correlation with the patient's symptoms, laboratory values, and urinalysis recommended  Small hiatal hernia is questioned  Other findings as above  Findings are consistent with the preliminary report from [facility] which was provided shortly after completion of the exam                Workstation performed: [id number]                    Procedures  ECG 12 Lead Documentation Only    Date/Time: 6/20/2023 6:34 AM    Performed by: Gunjan Littlejohn MD  Authorized by: Gunjan Littlejohn MD    Comments:      ECG 12 Lead Documentation  Date/Time: today/date: 6/20/2023  Performed by: Libra Mead  Authorized by:  Libra Mead    ECG reviewed by me, the ED Provider: yes    Patient location:  ED   Previous ECG: If available: no  Rate:  ECG rate assessment: tachy 123  Rhythm: sinus rhythm    Ectopy: none    QRS axis:  Normal  Intervals: normal   Q waves: None   ST segments:  No acute ST changes  T waves: normal, non specific inversion lead III    Impression: Sinus tach EKG               ED Course                               SBIRT 20yo+    Flowsheet Row Most Recent Value   Initial Alcohol Screen: US AUDIT-C     1  How often do you have a drink containing alcohol? 0 Filed at: 06/19/2023 2205   2  How many drinks containing alcohol do you have on a typical day you are drinking? 0 Filed at: 06/19/2023 2205   3a  Male UNDER 65: How often do you have five or more drinks on one occasion? 0 Filed at: 06/19/2023 2205   3b  FEMALE Any Age, or MALE 65+: How often do you have 4 or more drinks on one occassion? 0 Filed at: 06/19/2023 2205   Audit-C Score 0 Filed at: 06/19/2023 2205   HERMINIO: How many times in the past year have you    Used an illegal drug or used a prescription medication for non-medical reasons? Never Filed at: 06/19/2023 2205                    MDM    Amount and/or Complexity of Data Reviewed  Clinical lab tests: ordered and reviewed, sepsis work up initiated  Reviewed past medical records: yes, hx of trach, hx of HTN     History Provided by patient and daughter in room    Differential considered: sepsis, likely secondary to UTI  Other Likely sources could include pneumonia, bacteremia, soft tissue infection, GI pathology    Consideration of tests: sepsis labs including lactic acid, CBC for any acute changes to wbc count or any acute electrolyte abnormalities  LFTs for acute liver pathology, elevated Cr, UA for infection,  Imaging for pneumonia and /or intraabdominal pathology  Lactic < 4, hydration provided, but does not meet 30 cc / kg hydration requirement  IV abx initiated  Labs show:   NO Leukocytosis suggestive of infection   Procalcitonin is not  elevated to suggest bacterial infection  No Signs of end organ damage  (normal trop and Cr)  UA does show signs of UTI  Chest imaging does not show signs of pneumonia    CT scan read by SOTO to show cystitis  Patient had resolution of fever and tachycardia and feels better after IV abx and fluids  Patient offered admission but declined at this time, prefers to go home   Will prescribe oral abx, patient already has pcp appt for follow up in two days with close follow up  Return precautions discussed, if febrile or worse symptoms, to return to ER immediately  The patient was instructed to follow up as documented  Strict return precautions were discussed with the patient and the patient was instructed to return to the emergency department immediately if symptoms worsen  The patient/patient family member acknowledged and were in agreement with plan  Disposition  Final diagnoses:   UTI (urinary tract infection)   Cystitis   Encounter to establish care     Time reflects when diagnosis was documented in both MDM as applicable and the Disposition within this note     Time User Action Codes Description Comment    6/20/2023  2:27 AM Elenor Flor Add [N39 0] UTI (urinary tract infection)     6/20/2023  2:27 AM Elenor Flor Add [N30 90] Cystitis     6/20/2023  2:27 AM Elenor Flor Add [Z76 [id number]] Encounter to establish care       ED Disposition     ED Disposition   Discharge    Condition   Stable    Date/Time   Tue Jun 20, 2023  2:27 AM    Comment   Kiara Alvarenga discharge to home/self care                 Follow-up Information     Follow up With Specialties Details Why Contact Info Additional [address] [facility] Family Medicine Schedule an appointment as soon as possible for a visit in 3 days For follow up regarding your symptoms and recheck, To find a primary care doctor [address], [address] [facility] [phone]  [address], [address], [address], [place] [address] [facility] Emergency Medicine Go to  If symptoms worsen [address] [address]  [facility] Emergency Department          Discharge Medication List as of 6/20/2023  2:28 AM      START taking these medications    Details   cefuroxime (CEFTIN) 500 mg tablet Take 1 tablet (500 mg total) by mouth every 12 (twelve) hours for 7 days, Starting Tue 6/20/2023, Until Tue 6/27/2023, Normal         CONTINUE these medications which have NOT CHANGED    Details   albuterol (2 5 mg/3 mL) 0 083 % nebulizer solution Take 3 mL (2 5 mg total) by nebulization every 6 (six) hours as needed for wheezing or shortness of breath, Starting Thu 4/20/2023, Normal      albuterol (PROVENTIL HFA,VENTOLIN HFA) 90 mcg/act inhaler INHALE 2 PUFFS EVERY 6 (SIX) HOURS AS NEEDED FOR WHEEZING, Starting Fri 6/9/2023, Normal      amLODIPine (NORVASC) 10 mg tablet TAKE ONE TABLET BY MOUTH DAILY, Normal      atorvastatin (LIPITOR) 20 mg tablet TAKE ONE TABLET BY MOUTH DAILY, Normal      budesonide-formoterol (SYMBICORT) 80-4 5 MCG/ACT inhaler INHALE 2 PUFFS 2 (TWO) TIMES A DAY AS NEEDED (ASTHMA) RINSE MOUTH AFTER USE , Starting Sun 4/23/2023, Normal      diclofenac (VOLTAREN) 75 mg EC tablet Take 1 tablet (75 mg total) by mouth 2 (two) times a day, Starting u 4/20/2023, Normal      Diclofenac Sodium (VOLTAREN) 1 % APPLY 2 G TOPICALLY 4 (FOUR) TIMES A DAY, Starting Sat 6/10/2023, Normal      enalapril (VASOTEC) 10 mg tablet TAKE ONE TABLET BY MOUTH BY MOUTH DAILY, Normal      fluticasone (FLONASE) 50 mcg/act nasal spray 1 SPRAY INTO EACH NOSTRIL DAILY, Starting Sun 4/23/2023, Normal      levothyroxine 100 mcg tablet TAKE ONE TABLET BY MOUTH DAILY, Normal      !! methylPREDNISolone 4 MG tablet therapy pack Use as directed on package, Normal      !! methylPREDNISolone 4 MG tablet therapy pack Use as directed on package, Normal      omeprazole (PriLOSEC) 40 MG capsule Take 1 capsule (40 mg total) by mouth daily, Starting Sun 4/23/2023, Normal       !! - Potential duplicate medications found  Please discuss with provider  No discharge procedures on file      PDMP Review     None          ED Provider  Electronically Signed by         Renea Tabares MD  06/20/23 1733

## 2023-06-22 ENCOUNTER — OFFICE VISIT (OUTPATIENT)
Dept: FAMILY MEDICINE CLINIC | Facility: CLINIC | Age: 58
End: 2023-06-22

## 2023-06-22 VITALS
RESPIRATION RATE: 16 BRPM | HEART RATE: 98 BPM | WEIGHT: 176 LBS | BODY MASS INDEX: 32.39 KG/M2 | SYSTOLIC BLOOD PRESSURE: 102 MMHG | HEIGHT: 62 IN | DIASTOLIC BLOOD PRESSURE: 68 MMHG | TEMPERATURE: 96.3 F | OXYGEN SATURATION: 99 %

## 2023-06-22 DIAGNOSIS — J45.909 UNCOMPLICATED ASTHMA, UNSPECIFIED ASTHMA SEVERITY, UNSPECIFIED WHETHER PERSISTENT: ICD-10-CM

## 2023-06-22 DIAGNOSIS — Z43.0 TRACHEOSTOMY CARE (HCC): Primary | ICD-10-CM

## 2023-06-22 LAB — BACTERIA UR CULT: ABNORMAL

## 2023-06-22 PROCEDURE — 99213 OFFICE O/P EST LOW 20 MIN: CPT | Performed by: FAMILY MEDICINE

## 2023-06-22 NOTE — PROGRESS NOTES
"Name: Brandi Grubbs      : 1965      MRN: [id number]  Encounter Provider: Chidi Armando MD  Encounter Date: 2023   Encounter department: [address] [place] [address]     1  Tracheostomy care [facility])  Assessment & Plan:  S/p trach placement at [facility]  No access to records  Please see HPI  Patient requesting \"humanitarian visa letter\"  Will place home nurse eval to assess need and follow up with patient in 3 weeks  Orders:  -     Referral to [address] [place] [address]; Future    2  Uncomplicated asthma, unspecified asthma severity, unspecified whether persistent  -     Ambulatory Referral to Pulmonology; Future         Subjective     This is a very pleasant 65 y o  female who presents to the clinic for management of her tracheostomy  Patient was recently referred to ENT for hoarseness and chronic throat clearing  She reports seeing a provider at [address] [place] [address] who diagnosed her with vocal cord paralysis  She subsequently underwent permanent trach placement  She is unable to give detailed hx as to why this was done as she is still learning to speak with trach in place  She is accompanied by daughter who is not entirely informed either  I have no access to her [facility] records and she is unaware as to whether or not she ever consented to restricted access  Have advised to contact N to share access  She reports to having difficulty with trach care  I am aware that she did have a home nurse evaluation via [facility] after tracheostomy who deemed her independent for self care  She reports continued difficulty with maintenance and care and has expressed needing round the clock care  Specifically, she is in the process of obtaining a humanitarian visa so that her daughter can immigrate here from the  for the specific purpose of providing assistance with care  She has been told by immigration agent that her doctor will need to provide letter documenting need       Currently " denies any issues with trach including signs of infection, fever, or pain  Review of Systems   Constitutional: Negative for chills and fever  HENT: Negative for congestion, ear pain, rhinorrhea and sinus pain  Eyes: Negative for visual disturbance  Respiratory: Negative for chest tightness, shortness of breath and wheezing  Cardiovascular: Negative for chest pain and palpitations  Gastrointestinal: Negative for abdominal pain, constipation, diarrhea and vomiting  Endocrine: Negative for polyuria  Genitourinary: Negative for dysuria  Musculoskeletal: Negative for arthralgias and myalgias  Neurological: Negative for dizziness, syncope and light-headedness  Psychiatric/Behavioral: Negative for hallucinations, self-injury and suicidal ideas  Past Medical History:   Diagnosis Date   • Hypertension      Past Surgical History:   Procedure Laterality Date   • APPENDECTOMY     • HYSTERECTOMY     • NOSE SURGERY     • THYROID SURGERY       No family history on file  Social History     Socioeconomic History   • Marital status:       Spouse name: None   • Number of children: None   • Years of education: None   • Highest education level: None   Occupational History   • None   Tobacco Use   • Smoking status: Never   • Smokeless tobacco: Never   Substance and Sexual Activity   • Alcohol use: Not Currently   • Drug use: Never   • Sexual activity: None   Other Topics Concern   • None   Social History Narrative   • None     Social Determinants of Health     Financial Resource Strain: Low Risk  (11/7/2022)    Overall Financial Resource Strain (CARDIA)    • Difficulty of Paying Living Expenses: Not hard at all   Food Insecurity: No Food Insecurity (11/7/2022)    Hunger Vital Sign    • Worried About Running Out of Food in the Last Year: Never true    • Ran Out of Food in the Last Year: Never true   Transportation Needs: No Transportation Needs (11/7/2022)    PRAPARE - Transportation    • Lack of "Transportation (Medical): No    • Lack of Transportation (Non-Medical): No   Physical Activity: Not on file   Stress: Not on file   Social Connections: Not on file   Intimate Partner Violence: Not on file   Housing Stability: Not on file     Current Outpatient Medications on File Prior to Visit   Medication Sig   • albuterol (2 5 mg/3 mL) 0 083 % nebulizer solution Take 3 mL (2 5 mg total) by nebulization every 6 (six) hours as needed for wheezing or shortness of breath   • albuterol (PROVENTIL HFA,VENTOLIN HFA) 90 mcg/act inhaler INHALE 2 PUFFS EVERY 6 (SIX) HOURS AS NEEDED FOR WHEEZING   • amLODIPine (NORVASC) 10 mg tablet TAKE ONE TABLET BY MOUTH DAILY   • atorvastatin (LIPITOR) 20 mg tablet TAKE ONE TABLET BY MOUTH DAILY   • budesonide-formoterol (SYMBICORT) 80-4 5 MCG/ACT inhaler INHALE 2 PUFFS 2 (TWO) TIMES A DAY AS NEEDED (ASTHMA) RINSE MOUTH AFTER USE  • cefuroxime (CEFTIN) 500 mg tablet Take 1 tablet (500 mg total) by mouth every 12 (twelve) hours for 7 days   • diclofenac (VOLTAREN) 75 mg EC tablet Take 1 tablet (75 mg total) by mouth 2 (two) times a day   • Diclofenac Sodium (VOLTAREN) 1 % APPLY 2 G TOPICALLY 4 (FOUR) TIMES A DAY   • enalapril (VASOTEC) 10 mg tablet TAKE ONE TABLET BY MOUTH BY MOUTH DAILY   • fluticasone (FLONASE) 50 mcg/act nasal spray 1 SPRAY INTO EACH NOSTRIL DAILY   • levothyroxine 100 mcg tablet TAKE ONE TABLET BY MOUTH DAILY   • methylPREDNISolone 4 MG tablet therapy pack Use as directed on package   • methylPREDNISolone 4 MG tablet therapy pack Use as directed on package   • omeprazole (PriLOSEC) 40 MG capsule Take 1 capsule (40 mg total) by mouth daily     No Known Allergies    There is no immunization history on file for this patient      Objective     /68 (BP Location: Right arm, Patient Position: Sitting, Cuff Size: Standard)   Pulse 98   Temp (!) 96 3 °F (35 7 °C) (Temporal)   Resp 16   Ht 5' 2\" (1 575 m)   Wt 79 8 kg (176 lb)   SpO2 99%   BMI 32 19 kg/m² " Physical Exam  Constitutional:       Appearance: Normal appearance  HENT:      Head: Normocephalic and atraumatic  Nose: Nose normal    Eyes:      Conjunctiva/sclera: Conjunctivae normal    Neck:      Comments: Trach in place, no signs of infection or irritation  Cardiovascular:      Rate and Rhythm: Normal rate  Pulmonary:      Effort: Pulmonary effort is normal    Musculoskeletal:         General: Normal range of motion  Skin:     General: Skin is warm and dry  Neurological:      Mental Status: She is alert and oriented to person, place, and time     Psychiatric:         Behavior: Behavior normal        Ramesh Sheppard MD

## 2023-06-23 PROBLEM — Z43.0 TRACHEOSTOMY CARE (HCC): Status: ACTIVE | Noted: 2023-06-23

## 2023-06-23 NOTE — ASSESSMENT & PLAN NOTE
"S/p trach placement at [facility]  No access to records  Please see HPI  Patient requesting \"humanitarian visa letter\"  Will place home nurse eval to assess need and follow up with patient in 3 weeks    "

## 2023-06-25 LAB
BACTERIA BLD CULT: NORMAL
BACTERIA BLD CULT: NORMAL

## 2023-07-14 DIAGNOSIS — J45.909 UNCOMPLICATED ASTHMA, UNSPECIFIED ASTHMA SEVERITY, UNSPECIFIED WHETHER PERSISTENT: ICD-10-CM

## 2023-07-16 RX ORDER — FLUTICASONE PROPIONATE 50 MCG
1 SPRAY, SUSPENSION (ML) NASAL DAILY
Qty: 16 G | Refills: 3 | Status: SHIPPED | OUTPATIENT
Start: 2023-07-16

## 2023-08-08 DIAGNOSIS — J45.909 UNCOMPLICATED ASTHMA, UNSPECIFIED ASTHMA SEVERITY, UNSPECIFIED WHETHER PERSISTENT: ICD-10-CM

## 2023-08-08 DIAGNOSIS — K21.9 GASTROESOPHAGEAL REFLUX DISEASE WITHOUT ESOPHAGITIS: ICD-10-CM

## 2023-08-08 RX ORDER — OMEPRAZOLE 40 MG/1
40 CAPSULE, DELAYED RELEASE ORAL DAILY
Qty: 90 CAPSULE | Refills: 0 | Status: SHIPPED | OUTPATIENT
Start: 2023-08-08

## 2023-08-08 RX ORDER — FLUTICASONE PROPIONATE 50 MCG
1 SPRAY, SUSPENSION (ML) NASAL DAILY
Qty: 16 G | Refills: 3 | Status: SHIPPED | OUTPATIENT
Start: 2023-08-08

## 2023-10-03 ENCOUNTER — OFFICE VISIT (OUTPATIENT)
Dept: DENTISTRY | Facility: CLINIC | Age: 58
End: 2023-10-03

## 2023-10-03 VITALS — SYSTOLIC BLOOD PRESSURE: 106 MMHG | HEART RATE: 83 BPM | DIASTOLIC BLOOD PRESSURE: 74 MMHG | TEMPERATURE: 98.1 F

## 2023-10-03 DIAGNOSIS — Z01.20 ENCOUNTER FOR DENTAL EXAMINATION: Primary | ICD-10-CM

## 2023-10-03 PROCEDURE — D0120 PERIODIC ORAL EVALUATION - ESTABLISHED PATIENT: HCPCS | Performed by: DENTIST

## 2023-10-03 PROCEDURE — D1110 PROPHYLAXIS - ADULT: HCPCS | Performed by: DENTAL HYGIENIST

## 2023-10-03 RX ORDER — MULTIVITAMIN
TABLET ORAL
COMMUNITY
Start: 2023-09-04

## 2023-10-03 RX ORDER — SENNOSIDES 8.6 MG
TABLET ORAL
COMMUNITY
Start: 2023-08-03

## 2023-10-03 RX ORDER — LEVOTHYROXINE SODIUM 0.07 MG/1
TABLET ORAL
COMMUNITY
Start: 2023-09-23

## 2023-10-03 RX ORDER — METHOCARBAMOL 750 MG/1
TABLET, FILM COATED ORAL
COMMUNITY
Start: 2023-07-14

## 2023-10-03 NOTE — DENTAL PROCEDURE DETAILS
Gwen Hugo presents for a Periodic exam. Verbal consent for treatment given in addition to the forms. Reviewed health history - Patient is ASA II  Consents signed: Yes     Perio: Normal and Recession  Pain Scale: 0  Caries Assessment: Medium  Radiographs: None  EO/IO/OCS:  WNL     Oral Hygiene instruction reviewed and given. OHI: Good  ---Lt calc and plaque  ---Handscaled, polish, floss  Recommended Hygiene recall visits with Chana Spangler. Treatment Plan:  1.  6mrc  2. Caries control:   23 - DFL,  Watch 22- D  3. Occlusal evaluation:   Wears CUD;  missing lower posterior teeth  4. Case Difficulty Type 1    Prognosis is Good.   Referrals needed: No  Exam:  Dr. Liana Ng:  Rest 23 - DFL and Initial imps for CUD and LPD - 60 min - if pt agrees to prices  NV2:  6mrc - 50 min w/ Rodrick Brannon

## 2023-10-23 ENCOUNTER — TELEPHONE (OUTPATIENT)
Dept: DENTISTRY | Facility: CLINIC | Age: 58
End: 2023-10-23

## 2023-10-25 ENCOUNTER — OFFICE VISIT (OUTPATIENT)
Dept: DENTISTRY | Facility: CLINIC | Age: 58
End: 2023-10-25

## 2023-10-25 VITALS — HEART RATE: 78 BPM | DIASTOLIC BLOOD PRESSURE: 79 MMHG | SYSTOLIC BLOOD PRESSURE: 109 MMHG | TEMPERATURE: 97.7 F

## 2023-10-25 DIAGNOSIS — K02.9 DENTAL CARIES: Primary | ICD-10-CM

## 2023-10-25 PROCEDURE — D2332 RESIN-BASED COMPOSITE - 3 SURFACES, ANTERIOR: HCPCS

## 2023-10-25 NOTE — DENTAL PROCEDURE DETAILS
Pt presents for a dental restoration and verbally consents for treatment:    Reviewed health history-  Pt is ASA type 2  Treatment consents signed: Yes   Perio: Gingivitis   Pain Scale: 0   Caries Assessment: Medium   Radiographs: Films are current  Oral Hygiene instruction reviewed and given  Hygiene recall visits recommended to the pt. Upon clinical exam, recurrent decay noted on M of compidte restoration of #22 MFL. Informed pt that since we are treating #23 DFL today we can easily access #22 as well. Pt agreed. Pt was concerned about the finances for her dentures and has not made a payment yet. Informed pt she can speak to  either now or after appt to sort out finances before taking impressions for dentures. Pt chose to have fillings today and dentures started at next visit. Pt  agree with the diagnosis of caries and the  proposed treatment plan for the resin restoration:  Tooth #22 MFL, #23 DFL  Dental Anesthesia:  1.7 ml 2% Lidocaine w/1:100k epi given infiltration. Prepared ideal class 3 preps on #22,23 with 330 bur on high speed. Used round bur on slow speed to remove residual decay on axial wall and refined prep. Cotton roll Isolation. Mylar strip placed with wooden wedge. Material: Acid etch and rinse, Ivoclar louis scrubbed in, air thinned and cured, and Tetric bulk erlin resin placed and cured. Shade: A2    Checked occlusion and contacts with floss. Refined restorations with polishing strip and white polishing stones. Prognosis is Good.    Referrals Needed: No  Next visit: preliminary impressions for CUD and LPD

## 2023-11-01 DIAGNOSIS — J45.909 UNCOMPLICATED ASTHMA, UNSPECIFIED ASTHMA SEVERITY, UNSPECIFIED WHETHER PERSISTENT: ICD-10-CM

## 2023-11-01 RX ORDER — FLUTICASONE PROPIONATE 50 MCG
1 SPRAY, SUSPENSION (ML) NASAL DAILY
Qty: 16 G | Refills: 3 | OUTPATIENT
Start: 2023-11-01

## 2023-11-29 DIAGNOSIS — J45.909 UNCOMPLICATED ASTHMA, UNSPECIFIED ASTHMA SEVERITY, UNSPECIFIED WHETHER PERSISTENT: ICD-10-CM

## 2023-11-29 RX ORDER — FLUTICASONE PROPIONATE 50 MCG
1 SPRAY, SUSPENSION (ML) NASAL DAILY
Qty: 16 G | Refills: 3 | OUTPATIENT
Start: 2023-11-29

## 2023-12-07 ENCOUNTER — TELEPHONE (OUTPATIENT)
Dept: DENTISTRY | Facility: CLINIC | Age: 58
End: 2023-12-07

## 2023-12-07 DIAGNOSIS — E78.5 HYPERLIPIDEMIA, UNSPECIFIED HYPERLIPIDEMIA TYPE: ICD-10-CM

## 2023-12-07 DIAGNOSIS — I10 PRIMARY HYPERTENSION: ICD-10-CM

## 2023-12-07 NOTE — TELEPHONE ENCOUNTER
Called patient to schedule prelim appt for CUD/PLD advised of  SFS and provided financial counselor's phone number and office information if any questions.

## 2023-12-08 RX ORDER — ATORVASTATIN CALCIUM 20 MG/1
TABLET, FILM COATED ORAL
Qty: 30 TABLET | Refills: 3 | OUTPATIENT
Start: 2023-12-08

## 2023-12-08 RX ORDER — ENALAPRIL MALEATE 10 MG/1
TABLET ORAL
Qty: 30 TABLET | Refills: 3 | OUTPATIENT
Start: 2023-12-08

## 2023-12-08 RX ORDER — AMLODIPINE BESYLATE 10 MG/1
TABLET ORAL
Qty: 30 TABLET | Refills: 3 | OUTPATIENT
Start: 2023-12-08

## 2024-08-06 DIAGNOSIS — J45.909 UNCOMPLICATED ASTHMA, UNSPECIFIED ASTHMA SEVERITY, UNSPECIFIED WHETHER PERSISTENT: ICD-10-CM

## 2024-08-06 RX ORDER — ALBUTEROL SULFATE 90 UG/1
2 AEROSOL, METERED RESPIRATORY (INHALATION) EVERY 6 HOURS PRN
Qty: 18 G | Refills: 0 | Status: SHIPPED | OUTPATIENT
Start: 2024-08-06

## 2024-08-06 NOTE — TELEPHONE ENCOUNTER
Spoke with pt and she stated she is currently going to [facility].     She stated she had to move to [facility] due to her insurance being non par.